# Patient Record
Sex: FEMALE | Race: BLACK OR AFRICAN AMERICAN | NOT HISPANIC OR LATINO | Employment: FULL TIME | URBAN - METROPOLITAN AREA
[De-identification: names, ages, dates, MRNs, and addresses within clinical notes are randomized per-mention and may not be internally consistent; named-entity substitution may affect disease eponyms.]

---

## 2017-08-21 ENCOUNTER — TELEPHONE (OUTPATIENT)
Dept: OBSTETRICS AND GYNECOLOGY | Facility: CLINIC | Age: 37
End: 2017-08-21

## 2017-08-21 NOTE — TELEPHONE ENCOUNTER
----- Message from Lacie Gonzalez sent at 8/21/2017  9:25 AM CDT -----  X_  1st Request  _  2nd Request  _  3rd Request        Who: Duong([facility])    Why: [facility] representative is calling to see why the pt wasn't seen today. She was told she couldn't be seen today because she needed a referral. Please call to discuss.    What Number to Call Back:[phone] reference #[id number]    When to Expect a call back: (With in 24 hours)

## 2017-08-21 NOTE — TELEPHONE ENCOUNTER
Tried to contact the patient to offer an earlier appointment. Person who answered stated she was not available.

## 2017-09-15 ENCOUNTER — OFFICE VISIT (OUTPATIENT)
Dept: OBSTETRICS AND GYNECOLOGY | Facility: CLINIC | Age: 37
End: 2017-09-15
Attending: OBSTETRICS & GYNECOLOGY
Payer: COMMERCIAL

## 2017-09-15 VITALS
WEIGHT: 173.5 LBS | HEIGHT: 67 IN | BODY MASS INDEX: 27.23 KG/M2 | SYSTOLIC BLOOD PRESSURE: 120 MMHG | DIASTOLIC BLOOD PRESSURE: 82 MMHG

## 2017-09-15 DIAGNOSIS — Z01.419 WELL WOMAN EXAM WITH ROUTINE GYNECOLOGICAL EXAM: Primary | ICD-10-CM

## 2017-09-15 DIAGNOSIS — Z12.4 PAP SMEAR FOR CERVICAL CANCER SCREENING: ICD-10-CM

## 2017-09-15 DIAGNOSIS — Z30.41 ENCOUNTER FOR SURVEILLANCE OF CONTRACEPTIVE PILLS: ICD-10-CM

## 2017-09-15 PROCEDURE — 88175 CYTOPATH C/V AUTO FLUID REDO: CPT

## 2017-09-15 PROCEDURE — 99999 PR PBB SHADOW E&M-EST. PATIENT-LVL III: CPT | Mod: PBBFAC,,, | Performed by: OBSTETRICS & GYNECOLOGY

## 2017-09-15 PROCEDURE — 99385 PREV VISIT NEW AGE 18-39: CPT | Mod: S$GLB,,, | Performed by: OBSTETRICS & GYNECOLOGY

## 2017-09-15 NOTE — PROGRESS NOTES
Julio Cesar Fajardo is a 37 y.o. female  who presents for annual exam.  No LMP recorded (lmp unknown). Patient is not currently having periods (Reason: Birth Control).  She continues on LoLoEstrin without any problems.  With this pill, she is not having any periods.  History of LEEP  .  No GYN complaints.  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    Summary:  Pap 09: ASCUS, HPV+  Colpo 09: Ecto: No dysplasia, Ecc: Negative  Pap 6/18/10: LGSIL  Pap 11: LGSIL  Colpo 11: Ecto: EARLINE II, Ecc: Negative  LEEP 10/14/11: Focal atypia  Pap 3/15/13: Negative  Pap 3/19/14: ASCUS, HPV negative      Past Medical History:   Diagnosis Date    Abnormal Pap smear     Abnormal Pap smear of cervix     leep and colpo done       Past Surgical History:   Procedure Laterality Date    CERVICAL BIOPSY  W/ LOOP ELECTRODE EXCISION      COLPOSCOPY         OB History      Para Term  AB Living    0              SAB TAB Ectopic Multiple Live Births                       ROS:  GENERAL: Feeling well overall.   SKIN: Denies rash or lesions.   HEAD: Denies head injury or headache.   NODES: Denies enlarged lymph nodes.   CHEST: Denies chest pain or shortness of breath.   CARDIOVASCULAR: Denies palpitations or left sided chest pain.   ABDOMEN: No abdominal pain, nausea, vomiting or rectal bleeding.   URINARY: No dysuria or hematuria.  REPRODUCTIVE: See HPI.   BREASTS: Denies pain, lumps, or nipple discharge.   HEMATOLOGIC: No easy bruisability or excessive bleeding.   MUSCULOSKELETAL: Denies joint pain or swelling.   NEUROLOGIC: Denies syncope or weakness.   PSYCHIATRIC: Denies depression.    PE:   (chaperone present during entire exam)  APPEARANCE: Well nourished, well developed, in no acute distress.  BREASTS: Symmetrical, no skin changes or visible lesions. No palpable masses, nipple discharge or adenopathy bilaterally.  ABDOMEN: Soft. No tenderness or masses. No hernias. No CVA tenderness.  VULVA: No lesions.  Normal female genitalia.  URETHRAL MEATUS: Normal size and location, no lesions, no prolapse.  URETHRA: No masses, tenderness, prolapse or scarring.  VAGINA: Moist and well rugated, no discharge, no significant cystocele or rectocele.  CERVIX: No lesions and discharge. PAP done.  UTERUS: Normal size, regular shape, mobile, non-tender, bladder base nontender.  ADNEXA: No masses, tenderness or CDS nodularity.  ANUS PERINEUM: Normal.      Diagnosis:  1. Well woman exam with routine gynecological exam    2. Pap smear for cervical cancer screening    3. Encounter for surveillance of contraceptive pills          PLAN:    Orders Placed This Encounter    Liquid-based pap smear, screening    norethindrone-e.estradiol-iron (LO LOESTRIN FE) 1 mg-10 mcg (24)/10 mcg (2) Tab       Patient was counseled today on the need for annual gyn exams.  We reviewed her usage of OCPs: r / b.  She is doing well with LoLoEstrin and desires to continue.    Follow-up in 1 year.

## 2017-09-20 ENCOUNTER — PATIENT MESSAGE (OUTPATIENT)
Dept: OBSTETRICS AND GYNECOLOGY | Facility: CLINIC | Age: 37
End: 2017-09-20

## 2018-02-06 ENCOUNTER — TELEPHONE (OUTPATIENT)
Dept: OBSTETRICS AND GYNECOLOGY | Facility: CLINIC | Age: 38
End: 2018-02-06

## 2018-02-06 NOTE — TELEPHONE ENCOUNTER
----- Message from Laura Rossanarox sent at 2/6/2018  9:18 AM CST -----  Contact: [facility]   x_  1st Request  _  2nd Request  _  3rd Request        Who: Rony     Why: Requesting a call back in regards to getting a prior authorization for her Birth Control.     What Number to Call Back: [phone]    When to Expect a call back: (Within 24 hours)    Please return the call at earliest convenience. Thanks!

## 2018-02-19 ENCOUNTER — PATIENT MESSAGE (OUTPATIENT)
Dept: OBSTETRICS AND GYNECOLOGY | Facility: CLINIC | Age: 38
End: 2018-02-19

## 2018-02-20 ENCOUNTER — TELEPHONE (OUTPATIENT)
Dept: OBSTETRICS AND GYNECOLOGY | Facility: CLINIC | Age: 38
End: 2018-02-20

## 2018-02-20 NOTE — TELEPHONE ENCOUNTER
I will begin the PA process for you/. Please keep in touch.Selin  ===View-only below this line===      ----- Message -----     From: Chaparrita Fajardo     Sent: 2/19/2018  5:57 PM CST       To: Bart Alfaro MD  Subject: Prescription    Hello,    My current insurance company and pharmacist have informed me that my insurance will no longer cover my BCP without a prior authorization.  My pharmacist has contacted Dr. Alfaro's medical assistant or nurse about 2 weeks ago.  I am sending this message to follow-up because the prescription is ending soon.  I prefer to continue taking the Lo Loesterin. But if not possible, I may have to change to something else.    Kindest regards,  Chaparrita Fajardo

## 2018-02-22 NOTE — TELEPHONE ENCOUNTER
HECTOR to have the pharmacist to call the office to obtain the numbmer to obtain the authorization

## 2018-02-26 ENCOUNTER — TELEPHONE (OUTPATIENT)
Dept: OBSTETRICS AND GYNECOLOGY | Facility: CLINIC | Age: 38
End: 2018-02-26

## 2018-02-26 NOTE — TELEPHONE ENCOUNTER
----- Message from Amy Fernandez sent at 2/26/2018  4:24 PM CST -----  Contact: pt  X_  1st Request  _  2nd Request  _  3rd Request    Who:RASHAD DOUGLASS [[id number]]    Why: Patient states she would like to speak with the staff in regards to getting a prior authorization for her birth control... Please contact to further discuss and advise     What Number to Call Back: [phone]    When to Expect a call back: (Before the end of the day)   -- if call after 3:00 call back will be tomorrow.

## 2018-02-26 NOTE — TELEPHONE ENCOUNTER
Told the patient by looking in the chart selin was working on her Pa for BC and that I would let Selin know she called.

## 2018-03-02 NOTE — TELEPHONE ENCOUNTER
The patient's mail box is full. I have left messages on the pharmacy's voicemail with no return call. Trying to obtain the number to call to try to obtain the PA or form to complete for the PA. Will also offer the patient the option of changeling the pill or trying to use the coupon card and letter to the pharmacist.

## 2018-03-05 ENCOUNTER — TELEPHONE (OUTPATIENT)
Dept: OBSTETRICS AND GYNECOLOGY | Facility: CLINIC | Age: 38
End: 2018-03-05

## 2018-03-05 NOTE — TELEPHONE ENCOUNTER
----- Message from Cynthia Pedroza sent at 3/5/2018  2:13 PM CST -----  Contact: RASHAD DOUGLASS [[id number]]  _x  1st Request  _  2nd Request  _  3rd Request        Who: RASHAD DOUGLASS [[id number]]    Why: Requesting a call back in regards to getting prior authorization for birth control pills   Please return the call at earliest convenience. Thanks!    What Number to Call Back: [phone]      When to Expect a call back: (Within 24 hours)

## 2018-03-09 ENCOUNTER — PATIENT MESSAGE (OUTPATIENT)
Dept: OBSTETRICS AND GYNECOLOGY | Facility: CLINIC | Age: 38
End: 2018-03-09

## 2018-03-09 ENCOUNTER — TELEPHONE (OUTPATIENT)
Dept: OBSTETRICS AND GYNECOLOGY | Facility: CLINIC | Age: 38
End: 2018-03-09

## 2018-03-09 NOTE — TELEPHONE ENCOUNTER
----- Message from Martin Ghotra sent at 3/9/2018 12:33 PM CST -----  Contact: Pt  _  1st Request  _x  2nd Request  _  3rd Request        Who: RASHAD DOUGLASS [[id number]]    Why: Requesting a call back in regards to discussing message she sent via portal and would like nurse to take a look at it.   Please return the call at earliest convenience. Thanks!  ## pt would prefer a response through portal, states it is hard to catch her via telephone.       What Number to Call Back:[phone] (m)       When to Expect a call back: (Within 24 hours)

## 2018-03-17 ENCOUNTER — PATIENT MESSAGE (OUTPATIENT)
Dept: OBSTETRICS AND GYNECOLOGY | Facility: CLINIC | Age: 38
End: 2018-03-17

## 2018-03-19 ENCOUNTER — TELEPHONE (OUTPATIENT)
Dept: OBSTETRICS AND GYNECOLOGY | Facility: CLINIC | Age: 38
End: 2018-03-19

## 2018-03-19 NOTE — TELEPHONE ENCOUNTER
Well I understand but did not want to give in so I called one more time. The denial reason is because the current OCP is not on the formulary and we did not have recent trials of other OCP that you have used in the past that did not work for you. So I'm sorry, I will send the message to Dr Alfaro for another pill to be sent to replace the [place] estrin. Please check with your pharmacy later today or first thing in the morning. Selin  ===View-only below this line===      ----- Message -----     From: Chaparrita Fajardo     Sent: 3/17/2018  7:24 PM CDT       To: Bart Alfaro MD  Subject: Prescription    Selin Jackson.    Is there any update on the pre-authorization? If not approved, please ask Dr. Alfaro to send a prescription for a different BCP.   As much as I dislike letting the insurance company win, I really should be taking a BPC.    I use [facility] [facility] [phone].    Thank you,  Chaparrita Fajardo  ----- Message -----  From: Jt Smallwood  Sent: 3/12/2018 11:58 AM CDT  To: Chaparrita Fajardo  Subject: RE: Prescription  I was able to contact with new information and just got another form. Filling it out now and faxing it very shortly. Selin    ----- Message -----     From: Chaparrita Fajardo     Sent: 3/9/2018  4:29 PM CST       To: Bart Alfaro MD  Subject: RE: Prescription    Hello,    I just called my medical insurance company using my provider information. My previous call was to the member department.  I spoke to a representative in the PA department who gave a different fax number.  Please fax the PA form to [phone].  The phone number to the department is [phone]. The representative stated that some of the PAs can be approved via phone.       Thank you,  Chaparrita Fajardo  ----- Message -----  From: Jt Smallwood  Sent: 3/9/2018  3:09 PM CST  To: Chaparrita Fajardo  Subject: RE: Prescription  I am so sorry this process is taking this long. I have faxed to that number almost daily and after 8 attempts it  is abandoned. When called and was given another number and that was accepted but then I received a response saying that was the fax number for medical PA not prescription. I have showed Dr Alfaro and he is aware.  I can call on Monday again, I also have tried to contact your pharmacy(I have left message on the voicemail) to see if they would have another number to call. I know you were almost out of pills and don't know if you still may want to change to another pill simalair to this one. Please let me know, I will proceed whichever way you would like me to. Selin     ----- Message -----     From: Chaparrita Fajardo     Sent: 3/9/2018 12:27 PM CST       To: Bart Alfaro MD  Subject: Prescription    Hello Selin.    I contacted my insurance company and I have the correct fax number for the PA.  Please send the PA form to [phone].  Please reply and let me know if this number works.    Kindest regards,  Chaparrita Fajardo  ----- Message -----  From: Med Assistant Selin  Sent: 2/20/2018  5:08 PM CST  To: Chaparrita Fajardo  Subject: RE: Prescription  I will begin the PA process for you/. Please keep in touch.Selin    ----- Message -----     From: Chaparrita Fajardo     Sent: 2/19/2018  5:57 PM CST       To: Bart Alfaro MD  Subject: Prescription    Hello,    My current insurance company and pharmacist have informed me that my insurance will no longer cover my BCP without a prior authorization.  My pharmacist has contacted Dr. Alfaro's medical assistant or nurse about 2 weeks ago.  I am sending this message to follow-up because the prescription is ending soon.  I prefer to continue taking the Lo Loesterin. But if not possible, I may have to change to something else.    Kindest regards,  Chaparrita Fajardo

## 2018-03-20 RX ORDER — NORETHINDRONE ACETATE AND ETHINYL ESTRADIOL .02; 1 MG/1; MG/1
1 TABLET ORAL DAILY
Qty: 28 TABLET | Refills: 12 | Status: SHIPPED | OUTPATIENT
Start: 2018-03-20 | End: 2018-03-21 | Stop reason: SDUPTHER

## 2018-03-21 ENCOUNTER — TELEPHONE (OUTPATIENT)
Dept: OBSTETRICS AND GYNECOLOGY | Facility: CLINIC | Age: 38
End: 2018-03-21

## 2018-03-21 RX ORDER — NORETHINDRONE ACETATE AND ETHINYL ESTRADIOL .02; 1 MG/1; MG/1
1 TABLET ORAL DAILY
Qty: 28 TABLET | Refills: 12 | Status: SHIPPED | OUTPATIENT
Start: 2018-03-21 | End: 2018-12-05

## 2018-03-21 NOTE — TELEPHONE ENCOUNTER
I am sorry, I have to take [facility] (that is where it went) out not just change the pharmacy. Dr Alfaro will resend it to the correct pharmacy very shortly. Selin  ===View-only below this line===      ----- Message -----     From: Chaparrita Fajardo     Sent: 3/20/2018 10:06 PM CDT       To: Bart Alfaro MD  Subject: RE: Prescription    Selin Jackson.    Checked with my pharmacy.  They stated they have not received a new prescription. [facility] [phone].    Chaparrita  ----- Message -----  From: Med Assistant Selin  Sent: 3/19/2018  3:19 PM CDT  To: Chaparrita Fajardo  Subject: RE: Prescription  Well I understand but did not want to give in so I called one more time. The denial reason is because the current OCP is not on the formulary and we did not have recent trials of other OCP that you have used in the past that did not work for you. So I'm sorry, I will send the message to Dr Alfaro for another pill to be sent to replace the [place] estrin. Please check with your pharmacy later today or first thing in the morning. Selin

## 2018-08-04 ENCOUNTER — PATIENT MESSAGE (OUTPATIENT)
Dept: OBSTETRICS AND GYNECOLOGY | Facility: CLINIC | Age: 38
End: 2018-08-04

## 2018-12-05 ENCOUNTER — OFFICE VISIT (OUTPATIENT)
Dept: OBSTETRICS AND GYNECOLOGY | Facility: CLINIC | Age: 38
End: 2018-12-05
Attending: OBSTETRICS & GYNECOLOGY
Payer: COMMERCIAL

## 2018-12-05 VITALS
DIASTOLIC BLOOD PRESSURE: 84 MMHG | WEIGHT: 180.88 LBS | BODY MASS INDEX: 28.33 KG/M2 | SYSTOLIC BLOOD PRESSURE: 118 MMHG

## 2018-12-05 DIAGNOSIS — Z12.31 VISIT FOR SCREENING MAMMOGRAM: ICD-10-CM

## 2018-12-05 DIAGNOSIS — Z12.4 PAP SMEAR FOR CERVICAL CANCER SCREENING: ICD-10-CM

## 2018-12-05 DIAGNOSIS — Z01.419 WELL WOMAN EXAM WITH ROUTINE GYNECOLOGICAL EXAM: Primary | ICD-10-CM

## 2018-12-05 PROCEDURE — 99396 PREV VISIT EST AGE 40-64: CPT | Mod: S$GLB,,, | Performed by: OBSTETRICS & GYNECOLOGY

## 2018-12-05 PROCEDURE — 87624 HPV HI-RISK TYP POOLED RSLT: CPT

## 2018-12-05 PROCEDURE — 88175 CYTOPATH C/V AUTO FLUID REDO: CPT

## 2018-12-05 PROCEDURE — 99999 PR PBB SHADOW E&M-EST. PATIENT-LVL III: CPT | Mod: PBBFAC,,, | Performed by: OBSTETRICS & GYNECOLOGY

## 2018-12-05 RX ORDER — NORETHINDRONE ACETATE AND ETHINYL ESTRADIOL AND FERROUS FUMARATE 1MG-20(21)
KIT ORAL
Refills: 12 | COMMUNITY
Start: 2018-11-12 | End: 2018-12-05

## 2018-12-05 NOTE — PROGRESS NOTES
Julio Cesar Fajardo is a 38 y.o. female  who presents for annual exam.  Currently, she is taking Microgestin Fe with regular monthly menses lasting 7 days in duration.  Previously, she had been on LoLoEstrin with essentially no bleeding.  She wants to return to the LoLoEstrin.  She has a history of ERALINE 2, treated with LEEP .  Her last pap 2017 was negative.  She has a family history of breast cancer with her mother.  Patient's last menstrual period was 2018 (approximate).     Summary:  Pap 09: ASCUS, HPV+  Colpo 09: Ecto: No dysplasia, Ecc: Negative  Pap 6/18/10: LGSIL  Pap 11: LGSIL  Colpo 11: Ecto: EARLINE II, Ecc: Negative  LEEP 10/14/11: Focal atypia  Pap 3/15/13: Negative  Pap 3/19/14: ASCUS, HPV negative  Pap 9/15/17: Negative    Past Medical History:   Diagnosis Date    Abnormal Pap smear     Abnormal Pap smear of cervix     leep and colpo done       Past Surgical History:   Procedure Laterality Date    CERVICAL BIOPSY  W/ LOOP ELECTRODE EXCISION      COLPOSCOPY         OB History      Para Term  AB Living    0              SAB TAB Ectopic Multiple Live Births                       ROS:  GENERAL: Feeling well overall.   SKIN: Denies rash or lesions.   HEAD: Denies head injury or headache.   NODES: Denies enlarged lymph nodes.   CHEST: Denies chest pain or shortness of breath.   CARDIOVASCULAR: Denies palpitations or left sided chest pain.   ABDOMEN: No abdominal pain, nausea, vomiting or rectal bleeding.   URINARY: No dysuria or hematuria.  REPRODUCTIVE: See HPI.   BREASTS: Denies pain, lumps, or nipple discharge.   HEMATOLOGIC: No easy bruisability or excessive bleeding.   MUSCULOSKELETAL: Denies joint pain or swelling.   NEUROLOGIC: Denies syncope or weakness.   PSYCHIATRIC: Denies depression.    PE:   (chaperone present during entire exam)  APPEARANCE: Well nourished, well developed, in no acute distress.  BREASTS: Symmetrical, no skin changes or visible lesions.  No palpable masses, nipple discharge or adenopathy bilaterally.  ABDOMEN: Soft. No tenderness or masses. No hernias. No CVA tenderness.  VULVA: No lesions. Normal female genitalia.  URETHRAL MEATUS: Normal size and location, no lesions, no prolapse.  URETHRA: No masses, tenderness, prolapse or scarring.  VAGINA: Moist and well rugated, no discharge, no significant cystocele or rectocele.  CERVIX: No lesions and discharge. PAP done.  UTERUS: Normal size, regular shape, mobile, non-tender, bladder base nontender.  ADNEXA: No masses, tenderness or CDS nodularity.  ANUS PERINEUM: Normal.      Diagnosis:  1. Well woman exam with routine gynecological exam    2. Pap smear for cervical cancer screening    3. Visit for screening mammogram          PLAN:    Orders Placed This Encounter    HPV High Risk Genotypes, PCR    Mammo Digital Screening Bilat w/ Jose Carlos    Liquid-based pap smear, screening    norethindrone-e.estradiol-iron (LO LOESTRIN FE) 1 mg-10 mcg (24)/10 mcg (2) Tab       Patient was counseled today on the need for annual gyn exams.  We reviewed her usage of OCPs: r / b.  She will convert to LoLoEstrin.  We also discussed her family history of breast cancer and need for annual mammograms.    Follow-up in 1 year.

## 2018-12-09 ENCOUNTER — PATIENT MESSAGE (OUTPATIENT)
Dept: OBSTETRICS AND GYNECOLOGY | Facility: CLINIC | Age: 38
End: 2018-12-09

## 2018-12-10 ENCOUNTER — TELEPHONE (OUTPATIENT)
Dept: OBSTETRICS AND GYNECOLOGY | Facility: CLINIC | Age: 38
End: 2018-12-10

## 2018-12-10 RX ORDER — FLUCONAZOLE 150 MG/1
TABLET ORAL
Qty: 2 TABLET | Refills: 0 | Status: SHIPPED | OUTPATIENT
Start: 2018-12-10 | End: 2022-02-11

## 2018-12-10 NOTE — TELEPHONE ENCOUNTER
I will send the request to Dr Alfaro. Please check with the pharmacy later today. Selin  ===View-only below this line===      ----- Message -----     From: Chaparrita Fajardo     Sent: 12/9/2018 11:46 AM CST       To: Bart Alfaro MD  Subject: Prescription    Hello,    I am in need of a Diflucan prescription.  Please ask Dr. Alfaro to send it to [facility].    Thank you,  Chaparrita Fajardo

## 2018-12-11 LAB
HPV HR 12 DNA CVX QL NAA+PROBE: NEGATIVE
HPV16 AG SPEC QL: NEGATIVE
HPV18 DNA SPEC QL NAA+PROBE: NEGATIVE

## 2018-12-13 ENCOUNTER — PATIENT MESSAGE (OUTPATIENT)
Dept: OBSTETRICS AND GYNECOLOGY | Facility: CLINIC | Age: 38
End: 2018-12-13

## 2019-01-04 ENCOUNTER — HOSPITAL ENCOUNTER (OUTPATIENT)
Dept: RADIOLOGY | Facility: HOSPITAL | Age: 39
Discharge: HOME | End: 2019-01-04
Attending: OBSTETRICS & GYNECOLOGY
Payer: COMMERCIAL

## 2019-01-04 DIAGNOSIS — Z12.31 VISIT FOR SCREENING MAMMOGRAM: ICD-10-CM

## 2019-01-04 PROCEDURE — 77067 SCR MAMMO BI INCL CAD: CPT | Mod: 26,,, | Performed by: RADIOLOGY

## 2019-01-04 PROCEDURE — 77063 MAMMO DIGITAL SCREENING BILAT WITH TOMOSYNTHESIS_CAD: ICD-10-PCS | Mod: 26,,, | Performed by: RADIOLOGY

## 2019-01-04 PROCEDURE — 77067 SCR MAMMO BI INCL CAD: CPT | Mod: TC

## 2019-01-04 PROCEDURE — 77063 BREAST TOMOSYNTHESIS BI: CPT | Mod: 26,,, | Performed by: RADIOLOGY

## 2019-01-04 PROCEDURE — 77067 MAMMO DIGITAL SCREENING BILAT WITH TOMOSYNTHESIS_CAD: ICD-10-PCS | Mod: 26,,, | Performed by: RADIOLOGY

## 2019-01-09 ENCOUNTER — TELEPHONE (OUTPATIENT)
Dept: OBSTETRICS AND GYNECOLOGY | Facility: CLINIC | Age: 39
End: 2019-01-09

## 2019-02-04 NOTE — TELEPHONE ENCOUNTER
Called patient:    Unable to leave message - mailbox full.    Patient portal message sent:  I have been unable to reach you by phone.  Your recent mammogram was normal!  Each mammogram now has a Tyrer-Cuzick scale which is a tool to help determine patients that might be at an increased risk for breast cancer during their lifetime and could benefit for additional screening.  Your value was mildly elevated.  Please give us a call when you have a chance and we can discuss this in more detail.  -Dr. Alfaro

## 2019-02-06 ENCOUNTER — PATIENT MESSAGE (OUTPATIENT)
Dept: OBSTETRICS AND GYNECOLOGY | Facility: CLINIC | Age: 39
End: 2019-02-06

## 2019-12-18 RX ORDER — NORETHINDRONE ACETATE AND ETHINYL ESTRADIOL, ETHINYL ESTRADIOL AND FERROUS FUMARATE 1MG-10(24)
KIT ORAL
Qty: 28 TABLET | Refills: 3 | Status: SHIPPED | OUTPATIENT
Start: 2019-12-18 | End: 2020-04-01

## 2019-12-20 ENCOUNTER — PATIENT MESSAGE (OUTPATIENT)
Dept: OBSTETRICS AND GYNECOLOGY | Facility: CLINIC | Age: 39
End: 2019-12-20

## 2019-12-30 ENCOUNTER — PATIENT MESSAGE (OUTPATIENT)
Dept: OBSTETRICS AND GYNECOLOGY | Facility: CLINIC | Age: 39
End: 2019-12-30

## 2020-01-23 ENCOUNTER — TELEPHONE (OUTPATIENT)
Dept: OBSTETRICS AND GYNECOLOGY | Facility: CLINIC | Age: 40
End: 2020-01-23

## 2020-01-23 ENCOUNTER — PATIENT MESSAGE (OUTPATIENT)
Dept: OBSTETRICS AND GYNECOLOGY | Facility: CLINIC | Age: 40
End: 2020-01-23

## 2020-01-23 NOTE — TELEPHONE ENCOUNTER
"Called pt. No answer. Unable to leave a VM - "mailbox full  And not accepting calls at this time"    Calling regarding PA.      [facility] msg sent!  "

## 2020-04-01 RX ORDER — NORETHINDRONE ACETATE AND ETHINYL ESTRADIOL, ETHINYL ESTRADIOL AND FERROUS FUMARATE 1MG-10(24)
KIT ORAL
Qty: 28 TABLET | Refills: 3 | Status: SHIPPED | OUTPATIENT
Start: 2020-04-01 | End: 2020-07-23

## 2021-02-18 ENCOUNTER — PATIENT MESSAGE (OUTPATIENT)
Dept: OBSTETRICS AND GYNECOLOGY | Facility: CLINIC | Age: 41
End: 2021-02-18

## 2021-02-19 ENCOUNTER — TELEPHONE (OUTPATIENT)
Dept: OBSTETRICS AND GYNECOLOGY | Facility: CLINIC | Age: 41
End: 2021-02-19

## 2021-02-19 RX ORDER — NORETHINDRONE ACETATE AND ETHINYL ESTRADIOL, ETHINYL ESTRADIOL AND FERROUS FUMARATE 1MG-10(24)
1 KIT ORAL DAILY
Qty: 28 TABLET | Refills: 0 | Status: SHIPPED | OUTPATIENT
Start: 2021-02-19 | End: 2021-03-18

## 2021-04-22 RX ORDER — NORETHINDRONE ACETATE AND ETHINYL ESTRADIOL, ETHINYL ESTRADIOL AND FERROUS FUMARATE 1MG-10(24)
1 KIT ORAL DAILY
Qty: 28 TABLET | Refills: 2 | Status: SHIPPED | OUTPATIENT
Start: 2021-04-22 | End: 2021-08-19

## 2022-02-11 ENCOUNTER — OFFICE VISIT (OUTPATIENT)
Dept: OBSTETRICS AND GYNECOLOGY | Facility: CLINIC | Age: 42
End: 2022-02-11
Payer: COMMERCIAL

## 2022-02-11 VITALS
DIASTOLIC BLOOD PRESSURE: 82 MMHG | WEIGHT: 178.56 LBS | HEIGHT: 67 IN | SYSTOLIC BLOOD PRESSURE: 132 MMHG | BODY MASS INDEX: 28.02 KG/M2

## 2022-02-11 DIAGNOSIS — Z01.419 ENCOUNTER FOR ANNUAL ROUTINE GYNECOLOGICAL EXAMINATION: Primary | ICD-10-CM

## 2022-02-11 DIAGNOSIS — Z12.31 VISIT FOR SCREENING MAMMOGRAM: ICD-10-CM

## 2022-02-11 DIAGNOSIS — Z12.4 PAP SMEAR FOR CERVICAL CANCER SCREENING: ICD-10-CM

## 2022-02-11 DIAGNOSIS — Z30.41 ENCOUNTER FOR SURVEILLANCE OF CONTRACEPTIVE PILLS: ICD-10-CM

## 2022-02-11 PROCEDURE — 1160F PR REVIEW ALL MEDS BY PRESCRIBER/CLIN PHARMACIST DOCUMENTED: ICD-10-PCS | Mod: CPTII,S$GLB,, | Performed by: OBSTETRICS & GYNECOLOGY

## 2022-02-11 PROCEDURE — 99386 PR PREVENTIVE VISIT,NEW,40-64: ICD-10-PCS | Mod: S$GLB,,, | Performed by: OBSTETRICS & GYNECOLOGY

## 2022-02-11 PROCEDURE — 1159F MED LIST DOCD IN RCRD: CPT | Mod: CPTII,S$GLB,, | Performed by: OBSTETRICS & GYNECOLOGY

## 2022-02-11 PROCEDURE — 1159F PR MEDICATION LIST DOCUMENTED IN MEDICAL RECORD: ICD-10-PCS | Mod: CPTII,S$GLB,, | Performed by: OBSTETRICS & GYNECOLOGY

## 2022-02-11 PROCEDURE — 1160F RVW MEDS BY RX/DR IN RCRD: CPT | Mod: CPTII,S$GLB,, | Performed by: OBSTETRICS & GYNECOLOGY

## 2022-02-11 PROCEDURE — 3008F BODY MASS INDEX DOCD: CPT | Mod: CPTII,S$GLB,, | Performed by: OBSTETRICS & GYNECOLOGY

## 2022-02-11 PROCEDURE — 3079F DIAST BP 80-89 MM HG: CPT | Mod: CPTII,S$GLB,, | Performed by: OBSTETRICS & GYNECOLOGY

## 2022-02-11 PROCEDURE — 99999 PR PBB SHADOW E&M-EST. PATIENT-LVL III: CPT | Mod: PBBFAC,,, | Performed by: OBSTETRICS & GYNECOLOGY

## 2022-02-11 PROCEDURE — 99386 PREV VISIT NEW AGE 40-64: CPT | Mod: S$GLB,,, | Performed by: OBSTETRICS & GYNECOLOGY

## 2022-02-11 PROCEDURE — 3079F PR MOST RECENT DIASTOLIC BLOOD PRESSURE 80-89 MM HG: ICD-10-PCS | Mod: CPTII,S$GLB,, | Performed by: OBSTETRICS & GYNECOLOGY

## 2022-02-11 PROCEDURE — 3075F PR MOST RECENT SYSTOLIC BLOOD PRESS GE 130-139MM HG: ICD-10-PCS | Mod: CPTII,S$GLB,, | Performed by: OBSTETRICS & GYNECOLOGY

## 2022-02-11 PROCEDURE — 87624 HPV HI-RISK TYP POOLED RSLT: CPT | Performed by: OBSTETRICS & GYNECOLOGY

## 2022-02-11 PROCEDURE — 3075F SYST BP GE 130 - 139MM HG: CPT | Mod: CPTII,S$GLB,, | Performed by: OBSTETRICS & GYNECOLOGY

## 2022-02-11 PROCEDURE — 88175 CYTOPATH C/V AUTO FLUID REDO: CPT | Performed by: PATHOLOGY

## 2022-02-11 PROCEDURE — 88141 CYTOPATH C/V INTERPRET: CPT | Mod: ,,, | Performed by: PATHOLOGY

## 2022-02-11 PROCEDURE — 88141 PR  CYTOPATH CERV/VAG INTERPRET: ICD-10-PCS | Mod: ,,, | Performed by: PATHOLOGY

## 2022-02-11 PROCEDURE — 99999 PR PBB SHADOW E&M-EST. PATIENT-LVL III: ICD-10-PCS | Mod: PBBFAC,,, | Performed by: OBSTETRICS & GYNECOLOGY

## 2022-02-11 PROCEDURE — 3008F PR BODY MASS INDEX (BMI) DOCUMENTED: ICD-10-PCS | Mod: CPTII,S$GLB,, | Performed by: OBSTETRICS & GYNECOLOGY

## 2022-02-11 RX ORDER — NORETHINDRONE ACETATE AND ETHINYL ESTRADIOL, ETHINYL ESTRADIOL AND FERROUS FUMARATE 1MG-10(24)
1 KIT ORAL DAILY
Qty: 84 TABLET | Refills: 4 | Status: SHIPPED | OUTPATIENT
Start: 2022-02-11 | End: 2023-03-02

## 2022-02-11 RX ORDER — NORETHINDRONE ACETATE AND ETHINYL ESTRADIOL, ETHINYL ESTRADIOL AND FERROUS FUMARATE 1MG-10(24)
1 KIT ORAL DAILY
Qty: 28 TABLET | Refills: 0 | Status: CANCELLED | OUTPATIENT
Start: 2022-02-11

## 2022-02-11 NOTE — PROGRESS NOTES
Julio Cesar Fajardo is a 41 y.o. female  who presents for annual exam.  She continues on LoLoEstrin without any bleeding.  She has a history of LEEP  with essentially normal paps subsequently with last pap / exam .  Denies recent changes in her medical / surgical history.  No GYN complaints.   No LMP recorded (lmp unknown). (Menstrual status: Birth Control).     Summary:  Pap 09: ASCUS, HPV+  Colpo 09: Ecto: No dysplasia, Ecc: Negative  Pap 6/18/10: LGSIL  Pap 11: LGSIL  Colpo 11: Ecto: EARLINE II, Ecc: Negative  LEEP 10/14/11: Focal atypia  Pap 3/15/13: Negative  Pap 3/19/14: ASCUS, HPV negative  Pap 9/15/17: Negative  Pap 18: Negative      Past Medical History:   Diagnosis Date    Abnormal Pap smear     Abnormal Pap smear of cervix     leep and colpo done       Past Surgical History:   Procedure Laterality Date    CERVICAL BIOPSY  W/ LOOP ELECTRODE EXCISION      COLPOSCOPY         OB History        0    Para        Term   0            AB        Living           SAB        IAB        Ectopic        Multiple        Live Births                     ROS:  GENERAL: Feeling well overall.   SKIN: Denies rash or lesions.   HEAD: Denies head injury or headache.   NODES: Denies enlarged lymph nodes.   CHEST: Denies chest pain or shortness of breath.   CARDIOVASCULAR: Denies palpitations or left sided chest pain.   ABDOMEN: No abdominal pain, nausea, vomiting or rectal bleeding.   URINARY: No dysuria or hematuria.  REPRODUCTIVE: See HPI.   BREASTS: Denies pain, lumps, or nipple discharge.   HEMATOLOGIC: No easy bruisability or excessive bleeding.   MUSCULOSKELETAL: Denies joint pain or swelling.   NEUROLOGIC: Denies syncope or weakness.   PSYCHIATRIC: Denies depression.    PE:   (chaperone present during entire exam)  APPEARANCE: Well nourished, well developed, in no acute distress.  BREASTS: Symmetrical, no skin changes or visible lesions. No palpable masses, nipple discharge  or adenopathy bilaterally.  ABDOMEN: Soft. No tenderness or masses. No CVA tenderness.  VULVA: No lesions. Normal female genitalia.  URETHRAL MEATUS: Normal size and location, no lesions, no prolapse.  URETHRA: No masses, tenderness, prolapse or scarring.  VAGINA: Moist and well rugated, no abnormal discharge, no significant cystocele or rectocele.  CERVIX: No lesions and discharge. PAP done.  UTERUS: Normal size, retroflexed, non-tender, bladder base nontender.  ADNEXA: No masses, tenderness or CDS nodularity.  ANUS PERINEUM: Normal.      Diagnosis:  1. Encounter for annual routine gynecological examination    2. Pap smear for cervical cancer screening    3. Encounter for surveillance of contraceptive pills    4. Visit for screening mammogram          PLAN:    Orders Placed This Encounter    HPV High Risk Genotypes, PCR    Mammo Digital Screening Bilat w/ Jose Carlos    Liquid-Based Pap Smear, Screening    norethindrone-e.estradioL-iron (LO LOESTRIN FE) 1 mg-10 mcg (24)/10 mcg (2) Tab       Patient was counseled today on the need for annual gyn exams.  We reviewed her usage of OCPs: r / b.  She is doing well on LoLoEstrin and desires to continue.    Follow-up in 1 year.    Answers for HPI/ROS submitted by the patient on 2/10/2022  genital itching: No  genital lesions: No  genital odor: No  genital rash: No  missed menses: No  pelvic pain: No  vaginal bleeding: No  vaginal discharge: No  Pain severity: no pain  Pregnant now?: No  abdominal pain: No  anorexia: No  back pain: No  chills: No  constipation: No  diarrhea: No  discolored urine: No  dysuria: No  flank pain: No  frequency: No  headaches: No  hematuria: No  nausea: No  painful intercourse: No  rash: No  urgency: No  vomiting: No  Vaginal bleeding: no bleeding  Passing clots?: No  Passing tissue?: No  Birth control: oral contraceptives  STD: No  abdominal surgery: No   section: No  Ectopic pregnancy: No  Endometriosis: No  herpes simplex:  No  gynecological surgery: Yes  menorrhagia: No  metrorrhagia: No  miscarriage: No  ovarian cysts: No  perineal abscess: No  PID: No  terminated pregnancy: No  vaginosis: No

## 2022-02-16 LAB
HPV HR 12 DNA SPEC QL NAA+PROBE: NEGATIVE
HPV16 AG SPEC QL: NEGATIVE
HPV18 DNA SPEC QL NAA+PROBE: NEGATIVE

## 2022-02-18 LAB
FINAL PATHOLOGIC DIAGNOSIS: NORMAL
Lab: NORMAL

## 2022-02-20 ENCOUNTER — PATIENT MESSAGE (OUTPATIENT)
Dept: OBSTETRICS AND GYNECOLOGY | Facility: CLINIC | Age: 42
End: 2022-02-20
Payer: COMMERCIAL

## 2022-02-24 NOTE — TELEPHONE ENCOUNTER
- Cont Lopressor; dose increased to 50 mg BID on 2/22 due to uncontrolled rate (d/w cards)  - A fib w/ RVR 2/24 - given additional dose of metoprolol IV, rate improved - monitor   - Hold Eliquis until at least 14 days post craniotomy (3/3) -> d/w  Neurosurgery after clinic follow up prior to resuming.  - Monitor electrolytes closely.  - Telemetry    Called patient:     No answer - unable to leave message - mailbox full

## 2022-03-11 ENCOUNTER — HOSPITAL ENCOUNTER (OUTPATIENT)
Dept: RADIOLOGY | Facility: HOSPITAL | Age: 42
Discharge: HOME | End: 2022-03-11
Attending: OBSTETRICS & GYNECOLOGY
Payer: COMMERCIAL

## 2022-03-11 VITALS — HEIGHT: 69 IN | WEIGHT: 174 LBS | BODY MASS INDEX: 25.77 KG/M2

## 2022-03-11 DIAGNOSIS — Z01.419 ENCOUNTER FOR ANNUAL ROUTINE GYNECOLOGICAL EXAMINATION: ICD-10-CM

## 2022-03-11 PROCEDURE — 77067 SCR MAMMO BI INCL CAD: CPT | Mod: 26,,, | Performed by: RADIOLOGY

## 2022-03-11 PROCEDURE — 77067 MAMMO DIGITAL SCREENING BILAT WITH TOMO: ICD-10-PCS | Mod: 26,,, | Performed by: RADIOLOGY

## 2022-03-11 PROCEDURE — 77063 MAMMO DIGITAL SCREENING BILAT WITH TOMO: ICD-10-PCS | Mod: 26,,, | Performed by: RADIOLOGY

## 2022-03-11 PROCEDURE — 77063 BREAST TOMOSYNTHESIS BI: CPT | Mod: 26,,, | Performed by: RADIOLOGY

## 2022-03-11 PROCEDURE — 77067 SCR MAMMO BI INCL CAD: CPT | Mod: TC

## 2022-03-15 ENCOUNTER — PATIENT MESSAGE (OUTPATIENT)
Dept: OBSTETRICS AND GYNECOLOGY | Facility: CLINIC | Age: 42
End: 2022-03-15
Payer: COMMERCIAL

## 2023-03-02 RX ORDER — NORETHINDRONE ACETATE AND ETHINYL ESTRADIOL, ETHINYL ESTRADIOL AND FERROUS FUMARATE 1MG-10(24)
1 KIT ORAL DAILY
Qty: 28 TABLET | Refills: 4 | Status: SHIPPED | OUTPATIENT
Start: 2023-03-02 | End: 2023-07-29

## 2023-07-29 RX ORDER — NORETHINDRONE ACETATE AND ETHINYL ESTRADIOL, ETHINYL ESTRADIOL AND FERROUS FUMARATE 1MG-10(24)
1 KIT ORAL DAILY
Qty: 28 TABLET | Refills: 0 | Status: SHIPPED | OUTPATIENT
Start: 2023-07-29 | End: 2023-09-23 | Stop reason: SDUPTHER

## 2023-07-29 NOTE — TELEPHONE ENCOUNTER
Refill Routing Note   Medication(s) are not appropriate for processing by [facility] for the following reason(s):      Patient not seen by provider within 15 months  Required labs outdated    [facility] action(s):  Defer Care Due:  None identified            Appointments  past 12m or future 3m with PCP    Date Provider   Last Visit   2/11/2022 Bart Alfaro MD   Next Visit   Visit date not found Bart Alfaro MD   ED visits in past 90 days: 0        Note composed:10:12 AM 07/29/2023

## 2023-10-04 ENCOUNTER — PATIENT MESSAGE (OUTPATIENT)
Dept: OBSTETRICS AND GYNECOLOGY | Facility: CLINIC | Age: 43
End: 2023-10-04
Payer: COMMERCIAL

## 2023-10-06 ENCOUNTER — OFFICE VISIT (OUTPATIENT)
Dept: OBSTETRICS AND GYNECOLOGY | Facility: CLINIC | Age: 43
End: 2023-10-06
Attending: OBSTETRICS & GYNECOLOGY
Payer: COMMERCIAL

## 2023-10-06 VITALS
DIASTOLIC BLOOD PRESSURE: 100 MMHG | WEIGHT: 185.63 LBS | BODY MASS INDEX: 27.49 KG/M2 | SYSTOLIC BLOOD PRESSURE: 166 MMHG | HEIGHT: 69 IN

## 2023-10-06 DIAGNOSIS — N85.2 ENLARGED UTERUS: ICD-10-CM

## 2023-10-06 DIAGNOSIS — Z30.41 ORAL CONTRACEPTIVE PILL SURVEILLANCE: ICD-10-CM

## 2023-10-06 DIAGNOSIS — Z12.4 PAP SMEAR FOR CERVICAL CANCER SCREENING: ICD-10-CM

## 2023-10-06 DIAGNOSIS — Z12.31 VISIT FOR SCREENING MAMMOGRAM: ICD-10-CM

## 2023-10-06 DIAGNOSIS — Z01.419 WOMEN'S ANNUAL ROUTINE GYNECOLOGICAL EXAMINATION: Primary | ICD-10-CM

## 2023-10-06 PROCEDURE — 1159F MED LIST DOCD IN RCRD: CPT | Mod: CPTII,S$GLB,, | Performed by: OBSTETRICS & GYNECOLOGY

## 2023-10-06 PROCEDURE — 3008F BODY MASS INDEX DOCD: CPT | Mod: CPTII,S$GLB,, | Performed by: OBSTETRICS & GYNECOLOGY

## 2023-10-06 PROCEDURE — 99396 PR PREVENTIVE VISIT,EST,40-64: ICD-10-PCS | Mod: S$GLB,,, | Performed by: OBSTETRICS & GYNECOLOGY

## 2023-10-06 PROCEDURE — 88175 CYTOPATH C/V AUTO FLUID REDO: CPT | Performed by: OBSTETRICS & GYNECOLOGY

## 2023-10-06 PROCEDURE — 3077F SYST BP >= 140 MM HG: CPT | Mod: CPTII,S$GLB,, | Performed by: OBSTETRICS & GYNECOLOGY

## 2023-10-06 PROCEDURE — 99999 PR PBB SHADOW E&M-EST. PATIENT-LVL III: ICD-10-PCS | Mod: PBBFAC,,, | Performed by: OBSTETRICS & GYNECOLOGY

## 2023-10-06 PROCEDURE — 3008F PR BODY MASS INDEX (BMI) DOCUMENTED: ICD-10-PCS | Mod: CPTII,S$GLB,, | Performed by: OBSTETRICS & GYNECOLOGY

## 2023-10-06 PROCEDURE — 1160F RVW MEDS BY RX/DR IN RCRD: CPT | Mod: CPTII,S$GLB,, | Performed by: OBSTETRICS & GYNECOLOGY

## 2023-10-06 PROCEDURE — 3080F DIAST BP >= 90 MM HG: CPT | Mod: CPTII,S$GLB,, | Performed by: OBSTETRICS & GYNECOLOGY

## 2023-10-06 PROCEDURE — 99999 PR PBB SHADOW E&M-EST. PATIENT-LVL III: CPT | Mod: PBBFAC,,, | Performed by: OBSTETRICS & GYNECOLOGY

## 2023-10-06 PROCEDURE — 99396 PREV VISIT EST AGE 40-64: CPT | Mod: S$GLB,,, | Performed by: OBSTETRICS & GYNECOLOGY

## 2023-10-06 PROCEDURE — 3080F PR MOST RECENT DIASTOLIC BLOOD PRESSURE >= 90 MM HG: ICD-10-PCS | Mod: CPTII,S$GLB,, | Performed by: OBSTETRICS & GYNECOLOGY

## 2023-10-06 PROCEDURE — 1159F PR MEDICATION LIST DOCUMENTED IN MEDICAL RECORD: ICD-10-PCS | Mod: CPTII,S$GLB,, | Performed by: OBSTETRICS & GYNECOLOGY

## 2023-10-06 PROCEDURE — 1160F PR REVIEW ALL MEDS BY PRESCRIBER/CLIN PHARMACIST DOCUMENTED: ICD-10-PCS | Mod: CPTII,S$GLB,, | Performed by: OBSTETRICS & GYNECOLOGY

## 2023-10-06 PROCEDURE — 87624 HPV HI-RISK TYP POOLED RSLT: CPT | Performed by: OBSTETRICS & GYNECOLOGY

## 2023-10-06 PROCEDURE — 3077F PR MOST RECENT SYSTOLIC BLOOD PRESSURE >= 140 MM HG: ICD-10-PCS | Mod: CPTII,S$GLB,, | Performed by: OBSTETRICS & GYNECOLOGY

## 2023-10-06 RX ORDER — DROSPIRENONE 4 MG/1
4 TABLET, FILM COATED ORAL DAILY
Qty: 28 TABLET | Refills: 12 | Status: SHIPPED | OUTPATIENT
Start: 2023-10-06 | End: 2024-01-19 | Stop reason: SDUPTHER

## 2023-10-06 NOTE — PROGRESS NOTES
"Julio Cesar Fajardo is 43 y.o. female  who presents today for annual exam.  She is currently on LoLoEstrin OCPs without any problems.  With this pill, she essentially does not have any bleeding.  Denies recent changes in her medical or surgical history.  No gyn complaints.  Patient's last menstrual period was 2023 (exact date).    Blood pressure (!) 166/100, height 5' 9" (1.753 m), weight 84.2 kg (185 lb 10 oz), last menstrual period 2023.    Summary:  Pap 09: ASCUS, HPV+  Colpo 09: Ecto: No dysplasia, Ecc: Negative  Pap 6/18/10: LGSIL  Pap 11: LGSIL  Colpo 11: Ecto: EARLINE II, Ecc: Negative  LEEP 10/14/11: Focal atypia  Pap 3/15/13: Negative  Pap 3/19/14: ASCUS, HPV negative  Pap 9/15/17: Negative  Pap 18: Negative  Pap 22: Negative, HPV: Negative    3/11/22 Mammogram: Negative, TC 16.71       Past Medical History:   Diagnosis Date    Abnormal Pap smear     Abnormal Pap smear of cervix     leep and colpo done       Past Surgical History:   Procedure Laterality Date    CERVICAL BIOPSY  W/ LOOP ELECTRODE EXCISION      COLPOSCOPY           ROS:  GENERAL: Feeling well overall.   SKIN: Denies rash or lesions.   HEAD: Denies head injury or headache.   NODES: Denies enlarged lymph nodes.   CHEST: Denies chest pain or shortness of breath.   CARDIOVASCULAR: Denies palpitations or left sided chest pain.   ABDOMEN: No abdominal pain, nausea, vomiting or rectal bleeding.   URINARY: No dysuria or hematuria.  REPRODUCTIVE: See HPI.   BREASTS: Denies pain, lumps, or nipple discharge.   HEMATOLOGIC: No easy bruisability or excessive bleeding.   MUSCULOSKELETAL: Denies joint pain or swelling.   NEUROLOGIC: Denies syncope or weakness.   PSYCHIATRIC: Denies depression.    PE:   (chaperone present during entire exam)  APPEARANCE: Well nourished, well developed, in no acute distress.  BREASTS: Symmetrical, no skin changes or visible lesions. No palpable masses, nipple discharge or adenopathy " bilaterally.  ABDOMEN: Soft. No tenderness or masses. No CVA tenderness.  VULVA: No lesions. Normal female genitalia.  URETHRAL MEATUS: Normal size and location, no lesions, no prolapse.  URETHRA: No masses, tenderness, prolapse or scarring.  VAGINA: Moist and well rugated, no abnormal discharge, no significant cystocele or rectocele.  CERVIX: No lesions and discharge. PAP done.  UTERUS: Mildly enlarged in size, non-tender, bladder base nontender.  ADNEXA: No masses, tenderness or CDS nodularity.  ANUS PERINEUM: Normal.      Diagnosis:  1. Women's annual routine gynecological examination    2. Oral contraceptive pill surveillance    3. Enlarged uterus    4. Pap smear for cervical cancer screening    5. Visit for screening mammogram          PLAN:    Orders Placed This Encounter    HPV High Risk Genotypes, PCR    Mammo Digital Screening Bilat w/ Jose Carlos    US Pelvis Comp with Transvag NON-OB (xpd    Liquid-Based Pap Smear, Screening    drospirenone, contraceptive, (SLYND) 4 mg (28) Tab         Patient was counseled today on the usage of LoLoEstrin OCPs.  Her BP was noted to be significantly elevated: 170/100, 166/100.  We discussed the need for her to see her PCP for evaluation.  We also reviewed the risks with hypertension / OCPs and the need to convert from a combination pill to a progesterone only pill.  We discussed Slynd: r / b / correct usage.  She will let us know her progress with her BP.  We also discussed her uterus which felt mildly enlarge on exam today.  She has a family history of fibroids and will have a pelvic ultrasound performed for additional evaluation.    Follow-up after ultrasound.  Annual exam in one year.    This note was created with voice recognition software.  Grammatical, syntax and spelling errors may be inevitable.

## 2023-10-12 LAB
CLINICAL INFO: NORMAL
CYTO CVX: NORMAL
CYTOLOGIST CVX/VAG CYTO: NORMAL
CYTOLOGIST CVX/VAG CYTO: NORMAL
CYTOLOGY CMNT CVX/VAG CYTO-IMP: NORMAL
CYTOLOGY PAP THIN PREP EXPLANATION: NORMAL
DATE OF PREVIOUS PAP: NORMAL
DATE PREVIOUS BX: YES
GEN CATEG CVX/VAG CYTO-IMP: NORMAL
HPV I/H RISK 4 DNA CVX QL NAA+PROBE: NOT DETECTED
LMP START DATE: NORMAL
MICROORGANISM CVX/VAG CYTO: NORMAL
PATHOLOGIST CVX/VAG CYTO: NORMAL
SERVICE CMNT-IMP: NORMAL
SPECIMEN SOURCE CVX/VAG CYTO: NORMAL
STAT OF ADQ CVX/VAG CYTO-IMP: NORMAL

## 2023-10-16 ENCOUNTER — TELEPHONE (OUTPATIENT)
Dept: OBSTETRICS AND GYNECOLOGY | Facility: CLINIC | Age: 43
End: 2023-10-16
Payer: COMMERCIAL

## 2023-10-16 NOTE — TELEPHONE ENCOUNTER
Please let her know that HPV screening was negative but the pap cytology was unable to be processed by the lab.  She will need to return for another pap.  Thanks.

## 2023-10-18 ENCOUNTER — HOSPITAL ENCOUNTER (OUTPATIENT)
Dept: RADIOLOGY | Facility: OTHER | Age: 43
Discharge: HOME | End: 2023-10-18
Attending: OBSTETRICS & GYNECOLOGY
Payer: COMMERCIAL

## 2023-10-18 DIAGNOSIS — Z12.31 VISIT FOR SCREENING MAMMOGRAM: ICD-10-CM

## 2023-10-18 PROCEDURE — 77067 SCR MAMMO BI INCL CAD: CPT | Mod: TC

## 2023-10-18 PROCEDURE — 77067 SCR MAMMO BI INCL CAD: CPT | Mod: 26,,, | Performed by: RADIOLOGY

## 2023-10-18 PROCEDURE — 77067 MAMMO DIGITAL SCREENING BILAT WITH TOMO: ICD-10-PCS | Mod: 26,,, | Performed by: RADIOLOGY

## 2023-10-18 PROCEDURE — 77063 MAMMO DIGITAL SCREENING BILAT WITH TOMO: ICD-10-PCS | Mod: 26,,, | Performed by: RADIOLOGY

## 2023-10-18 PROCEDURE — 77063 BREAST TOMOSYNTHESIS BI: CPT | Mod: 26,,, | Performed by: RADIOLOGY

## 2023-10-23 ENCOUNTER — PATIENT MESSAGE (OUTPATIENT)
Dept: OBSTETRICS AND GYNECOLOGY | Facility: CLINIC | Age: 43
End: 2023-10-23
Payer: COMMERCIAL

## 2023-10-25 ENCOUNTER — PATIENT MESSAGE (OUTPATIENT)
Dept: OBSTETRICS AND GYNECOLOGY | Facility: CLINIC | Age: 43
End: 2023-10-25
Payer: COMMERCIAL

## 2023-11-10 ENCOUNTER — PATIENT MESSAGE (OUTPATIENT)
Dept: OBSTETRICS AND GYNECOLOGY | Facility: CLINIC | Age: 43
End: 2023-11-10
Payer: COMMERCIAL

## 2023-11-16 ENCOUNTER — TELEPHONE (OUTPATIENT)
Dept: OBSTETRICS AND GYNECOLOGY | Facility: CLINIC | Age: 43
End: 2023-11-16
Payer: COMMERCIAL

## 2023-11-16 NOTE — TELEPHONE ENCOUNTER
11/16/23 Message 10/2023 and 11/10/23 read notifying the patient a recollect was needed///////////10/23/23 Unable to leave a VM to have the patient call to schedule the recollect 10/25/23 Message sent through the portal     Detail Level: Simple Plan: 5FU BUD X 2-4 WEEKS

## 2023-11-27 ENCOUNTER — TELEPHONE (OUTPATIENT)
Dept: OBSTETRICS AND GYNECOLOGY | Facility: CLINIC | Age: 43
End: 2023-11-27
Payer: COMMERCIAL

## 2023-11-27 NOTE — TELEPHONE ENCOUNTER
Called patient:    Discussed pap:  Unsatisfactory, HPV Negative    REC:  Repeat pap    She wants to schedule appt on-line.

## 2023-12-09 ENCOUNTER — HOSPITAL ENCOUNTER (OUTPATIENT)
Dept: RADIOLOGY | Facility: OTHER | Age: 43
Discharge: HOME | End: 2023-12-09
Attending: OBSTETRICS & GYNECOLOGY
Payer: COMMERCIAL

## 2023-12-09 DIAGNOSIS — N85.2 ENLARGED UTERUS: ICD-10-CM

## 2023-12-09 PROCEDURE — 76856 US PELVIS COMP WITH TRANSVAG NON-OB (XPD): ICD-10-PCS | Mod: 26,,, | Performed by: RADIOLOGY

## 2023-12-09 PROCEDURE — 76830 TRANSVAGINAL US NON-OB: CPT | Mod: TC

## 2023-12-09 PROCEDURE — 76830 TRANSVAGINAL US NON-OB: CPT | Mod: 26,,, | Performed by: RADIOLOGY

## 2023-12-09 PROCEDURE — 76856 US EXAM PELVIC COMPLETE: CPT | Mod: 26,,, | Performed by: RADIOLOGY

## 2023-12-09 PROCEDURE — 76830 US PELVIS COMP WITH TRANSVAG NON-OB (XPD): ICD-10-PCS | Mod: 26,,, | Performed by: RADIOLOGY

## 2023-12-15 ENCOUNTER — TELEPHONE (OUTPATIENT)
Dept: OBSTETRICS AND GYNECOLOGY | Facility: CLINIC | Age: 43
End: 2023-12-15
Payer: COMMERCIAL

## 2023-12-15 NOTE — TELEPHONE ENCOUNTER
Called patient:    No answer, unable to leave message- mailbox full    Patient portal message sent.

## 2024-01-19 ENCOUNTER — OFFICE VISIT (OUTPATIENT)
Dept: OBSTETRICS AND GYNECOLOGY | Facility: CLINIC | Age: 44
End: 2024-01-19
Attending: OBSTETRICS & GYNECOLOGY
Payer: COMMERCIAL

## 2024-01-19 VITALS
BODY MASS INDEX: 27.46 KG/M2 | DIASTOLIC BLOOD PRESSURE: 90 MMHG | WEIGHT: 185.44 LBS | HEIGHT: 69 IN | SYSTOLIC BLOOD PRESSURE: 140 MMHG

## 2024-01-19 DIAGNOSIS — D21.9 FIBROIDS: ICD-10-CM

## 2024-01-19 DIAGNOSIS — R87.615 UNSATISFACTORY CERVICAL PAPANICOLAOU SMEAR: Primary | ICD-10-CM

## 2024-01-19 DIAGNOSIS — Z30.41 ENCOUNTER FOR SURVEILLANCE OF CONTRACEPTIVE PILLS: ICD-10-CM

## 2024-01-19 PROCEDURE — 88175 CYTOPATH C/V AUTO FLUID REDO: CPT | Performed by: OBSTETRICS & GYNECOLOGY

## 2024-01-19 PROCEDURE — 3080F DIAST BP >= 90 MM HG: CPT | Mod: CPTII,S$GLB,, | Performed by: OBSTETRICS & GYNECOLOGY

## 2024-01-19 PROCEDURE — 3077F SYST BP >= 140 MM HG: CPT | Mod: CPTII,S$GLB,, | Performed by: OBSTETRICS & GYNECOLOGY

## 2024-01-19 PROCEDURE — 99213 OFFICE O/P EST LOW 20 MIN: CPT | Mod: S$GLB,,, | Performed by: OBSTETRICS & GYNECOLOGY

## 2024-01-19 PROCEDURE — 3008F BODY MASS INDEX DOCD: CPT | Mod: CPTII,S$GLB,, | Performed by: OBSTETRICS & GYNECOLOGY

## 2024-01-19 PROCEDURE — 99999 PR PBB SHADOW E&M-EST. PATIENT-LVL III: CPT | Mod: PBBFAC,,, | Performed by: OBSTETRICS & GYNECOLOGY

## 2024-01-19 PROCEDURE — 1160F RVW MEDS BY RX/DR IN RCRD: CPT | Mod: CPTII,S$GLB,, | Performed by: OBSTETRICS & GYNECOLOGY

## 2024-01-19 PROCEDURE — 1159F MED LIST DOCD IN RCRD: CPT | Mod: CPTII,S$GLB,, | Performed by: OBSTETRICS & GYNECOLOGY

## 2024-01-19 RX ORDER — DROSPIRENONE 4 MG/1
4 TABLET, FILM COATED ORAL DAILY
Qty: 84 TABLET | Refills: 3 | Status: SHIPPED | OUTPATIENT
Start: 2024-01-19

## 2024-01-19 NOTE — PROGRESS NOTES
"Chief Complaint   Patient presents with    Follow-up       HPI:  Julio Cesar Fajardo is a 43 y.o. female patient  who presents today for follow-up pap as well as to discuss her recently diagnosed fibroids.  Pap performed at her annual exam 10/6/23 was unsatisfactory but HPV screening was negative.  At the time her annual exam, her uterus was noted to feel enlarged and a pelvic ultrasound was performed which revealed fibroids (largest 10 cm).  With her elevated BP, she was converted from LoLoEstrin to Slynd progesterone only pills and continues to have essentially no bleeding.  Denies pelvic pressure or pain.  No LMP recorded. (Menstrual status: Birth Control).    Blood pressure (!) 140/90, height 5' 9" (1.753 m), weight 84.1 kg (185 lb 6.5 oz).    Summary:  Pap 09: ASCUS, HPV+  Colpo 09: Ecto: No dysplasia, Ecc: Negative  Pap 6/18/10: LGSIL  Pap 11: LGSIL  Colpo 11: Ecto: EARLINE II, Ecc: Negative  LEEP 10/14/11: Focal atypia  Pap 3/15/13: Negative  Pap 3/19/14: ASCUS, HPV negative  Pap 9/15/17: Negative  Pap 18: Negative  Pap 22: Negative, HPV: Negative  Pap 10/6/23: Unsatisfactory, HPV: Negative     10/18/23 Mammogram: Negative, TC 17.3     23 Pelvic sono:  FINDINGS:  Uterus:  Size: 14 x 12 x 10 cm  Masses: Multiple fibroids largest measures at least 10 cm.  Endometrium: Obscured by the fibroids.  Right ovary:  Size: 3.0 cm  Appearance: Normal  Vascular flow: Normal.  Left ovary:  Size: 4.1 cm  Appearance: Normal  Vascular Flow: Normal.  Free Fluid:  None.  Impression:  Enlarged fibroid uterus         Past Medical History:   Diagnosis Date    Abnormal Pap smear     Abnormal Pap smear of cervix     leep and colpo done       ROS:  GENERAL: Feeling well overall.   SKIN: Denies rash or lesions.   HEAD: Denies head injury or headache.   NODES: Denies enlarged lymph nodes.   CHEST: Denies chest pain or shortness of breath.   CARDIOVASCULAR: Denies palpitations or left sided chest pain. "   ABDOMEN: No abdominal pain, nausea, vomiting or rectal bleeding.   URINARY: No dysuria or hematuria.  REPRODUCTIVE: See HPI.   BREASTS: Denies pain, lumps, or nipple discharge.   HEMATOLOGIC: No easy bruisability or excessive bleeding.   MUSCULOSKELETAL: Denies joint pain or swelling.   NEUROLOGIC: Denies syncope or weakness.   PSYCHIATRIC: Denies depression.    PE:   (chaperone present during entire exam)  APPEARANCE: Well nourished, well developed, in no acute distress.  ABDOMEN: Soft. No tenderness or masses.   VULVA: No lesions. Normal female genitalia.  VAGINA: Moist and well rugated, no abnormal discharge.  CERVIX: No lesions and discharge.  Pap performed      Diagnosis:  1. Unsatisfactory cervical Papanicolaou smear    2. Fibroids    3. Encounter for surveillance of contraceptive pills          PLAN:    Orders Placed This Encounter    Liquid-Based Pap Smear, Screening    drospirenone, contraceptive, (SLYND) 4 mg (28) Tab       Patient was counseled today on her unsatisfactory pap which was repeated today.  We also discussed her large fibroids and management options: 1) conservative observation  2) medical treatment  3) [place]  4) surgical treatment.  She is asymptomatic, without abnormal bleeding, pressure, or pain, and desires continued conservative management.  We discussed repeating the ultrasound in one year for re-evaluation of the fibroids.  We also reviewed her usage of Slynd with which she is doing well.  She was encouraged to see a PCP for evaluation of her BP    Follow-up for annual exam    I spent a total of 26 minutes on the day of the visit.This includes face to face time and non-face to face time preparing to see the patient (eg, review of tests), Obtaining and/or reviewing separately obtained history, Documenting clinical information in the electronic or other health record, Independently interpreting resultsand communicating results to the patient/family/caregiver, or Care  coordination.      This note was created with voice recognition software.  Grammatical, syntax and spelling errors may be inevitable.

## 2024-01-24 ENCOUNTER — PATIENT MESSAGE (OUTPATIENT)
Dept: OBSTETRICS AND GYNECOLOGY | Facility: CLINIC | Age: 44
End: 2024-01-24
Payer: COMMERCIAL

## 2024-01-24 LAB
FINAL PATHOLOGIC DIAGNOSIS: NORMAL
Lab: NORMAL

## 2024-04-28 ENCOUNTER — PATIENT MESSAGE (OUTPATIENT)
Dept: OBSTETRICS AND GYNECOLOGY | Facility: CLINIC | Age: 44
End: 2024-04-28
Payer: COMMERCIAL

## 2024-04-29 ENCOUNTER — TELEPHONE (OUTPATIENT)
Dept: OBSTETRICS AND GYNECOLOGY | Facility: CLINIC | Age: 44
End: 2024-04-29
Payer: COMMERCIAL

## 2024-04-29 RX ORDER — FLUCONAZOLE 150 MG/1
150 TABLET ORAL DAILY
Qty: 1 TABLET | Refills: 0 | Status: SHIPPED | OUTPATIENT
Start: 2024-04-29 | End: 2024-04-30

## 2024-04-29 NOTE — TELEPHONE ENCOUNTER
Dr Alfaro is out of the office until Thursday. I will send the message to his box for review. Please check with the pharmacy tomorrow morning, Selin  ===View-only below this line===      ----- Message -----       From:Julio Cesar Fajardo       Sent:4/28/2024  2:25 PM CDT         To:Bart Alfaro    Subject:Rx    Hello.  I am sending this message to inquire if someone from Dr. Galvan office could send an RX for Diflucan for me.

## 2024-09-13 ENCOUNTER — TELEPHONE (OUTPATIENT)
Dept: PRIMARY CARE CLINIC | Facility: CLINIC | Age: 44
End: 2024-09-13
Payer: COMMERCIAL

## 2024-09-13 ENCOUNTER — OFFICE VISIT (OUTPATIENT)
Dept: PRIMARY CARE CLINIC | Facility: CLINIC | Age: 44
End: 2024-09-13
Payer: COMMERCIAL

## 2024-09-13 ENCOUNTER — LAB VISIT (OUTPATIENT)
Dept: LAB | Facility: HOSPITAL | Age: 44
End: 2024-09-13
Attending: INTERNAL MEDICINE
Payer: COMMERCIAL

## 2024-09-13 VITALS
BODY MASS INDEX: 26.29 KG/M2 | WEIGHT: 177.5 LBS | OXYGEN SATURATION: 98 % | HEIGHT: 69 IN | SYSTOLIC BLOOD PRESSURE: 136 MMHG | RESPIRATION RATE: 16 BRPM | DIASTOLIC BLOOD PRESSURE: 84 MMHG | HEART RATE: 76 BPM

## 2024-09-13 DIAGNOSIS — Z00.00 PREVENTATIVE HEALTH CARE: Primary | ICD-10-CM

## 2024-09-13 DIAGNOSIS — I10 PRIMARY HYPERTENSION: ICD-10-CM

## 2024-09-13 DIAGNOSIS — Z00.00 PREVENTATIVE HEALTH CARE: ICD-10-CM

## 2024-09-13 DIAGNOSIS — Z80.3 FAMILY HISTORY OF BREAST CANCER IN MOTHER: ICD-10-CM

## 2024-09-13 LAB
ALBUMIN SERPL BCP-MCNC: 3.9 G/DL (ref 3.5–5.2)
ALP SERPL-CCNC: 110 U/L (ref 55–135)
ALT SERPL W/O P-5'-P-CCNC: 17 U/L (ref 10–44)
ANION GAP SERPL CALC-SCNC: 6 MMOL/L (ref 8–16)
AST SERPL-CCNC: 19 U/L (ref 10–40)
BILIRUB SERPL-MCNC: 0.5 MG/DL (ref 0.1–1)
BUN SERPL-MCNC: 11 MG/DL (ref 6–20)
CALCIUM SERPL-MCNC: 10.6 MG/DL (ref 8.7–10.5)
CHLORIDE SERPL-SCNC: 112 MMOL/L (ref 95–110)
CHOLEST SERPL-MCNC: 167 MG/DL (ref 120–199)
CHOLEST/HDLC SERPL: 2.1 {RATIO} (ref 2–5)
CO2 SERPL-SCNC: 22 MMOL/L (ref 23–29)
CREAT SERPL-MCNC: 0.9 MG/DL (ref 0.5–1.4)
ERYTHROCYTE [DISTWIDTH] IN BLOOD BY AUTOMATED COUNT: 13.6 % (ref 11.5–14.5)
EST. GFR  (NO RACE VARIABLE): >60 ML/MIN/1.73 M^2
ESTIMATED AVG GLUCOSE: 77 MG/DL (ref 68–131)
GLUCOSE SERPL-MCNC: 82 MG/DL (ref 70–110)
HAV IGM SERPL QL IA: NORMAL
HBA1C MFR BLD: 4.3 % (ref 4–5.6)
HBV CORE IGM SERPL QL IA: NORMAL
HBV SURFACE AG SERPL QL IA: NORMAL
HCT VFR BLD AUTO: 40.9 % (ref 37–48.5)
HCV AB SERPL QL IA: NORMAL
HDLC SERPL-MCNC: 81 MG/DL (ref 40–75)
HDLC SERPL: 48.5 % (ref 20–50)
HGB BLD-MCNC: 13.7 G/DL (ref 12–16)
HIV 1+2 AB+HIV1 P24 AG SERPL QL IA: NORMAL
LDLC SERPL CALC-MCNC: 79.6 MG/DL (ref 63–159)
MAGNESIUM SERPL-MCNC: 2.4 MG/DL (ref 1.6–2.6)
MCH RBC QN AUTO: 28.6 PG (ref 27–31)
MCHC RBC AUTO-ENTMCNC: 33.5 G/DL (ref 32–36)
MCV RBC AUTO: 85 FL (ref 82–98)
NONHDLC SERPL-MCNC: 86 MG/DL
PLATELET # BLD AUTO: 223 K/UL (ref 150–450)
PMV BLD AUTO: 11 FL (ref 9.2–12.9)
POTASSIUM SERPL-SCNC: 4.1 MMOL/L (ref 3.5–5.1)
PROT SERPL-MCNC: 7.6 G/DL (ref 6–8.4)
RBC # BLD AUTO: 4.79 M/UL (ref 4–5.4)
SODIUM SERPL-SCNC: 140 MMOL/L (ref 136–145)
TREPONEMA PALLIDUM IGG+IGM AB [PRESENCE] IN SERUM OR PLASMA BY IMMUNOASSAY: NONREACTIVE
TRIGL SERPL-MCNC: 32 MG/DL (ref 30–150)
TSH SERPL DL<=0.005 MIU/L-ACNC: 1.49 UIU/ML (ref 0.4–4)
WBC # BLD AUTO: 5.4 K/UL (ref 3.9–12.7)

## 2024-09-13 PROCEDURE — 3075F SYST BP GE 130 - 139MM HG: CPT | Mod: CPTII,S$GLB,, | Performed by: INTERNAL MEDICINE

## 2024-09-13 PROCEDURE — 84443 ASSAY THYROID STIM HORMONE: CPT | Performed by: INTERNAL MEDICINE

## 2024-09-13 PROCEDURE — 85027 COMPLETE CBC AUTOMATED: CPT | Performed by: INTERNAL MEDICINE

## 2024-09-13 PROCEDURE — 1159F MED LIST DOCD IN RCRD: CPT | Mod: CPTII,S$GLB,, | Performed by: INTERNAL MEDICINE

## 2024-09-13 PROCEDURE — 86696 HERPES SIMPLEX TYPE 2 TEST: CPT | Performed by: INTERNAL MEDICINE

## 2024-09-13 PROCEDURE — 3066F NEPHROPATHY DOC TX: CPT | Mod: CPTII,S$GLB,, | Performed by: INTERNAL MEDICINE

## 2024-09-13 PROCEDURE — 3079F DIAST BP 80-89 MM HG: CPT | Mod: CPTII,S$GLB,, | Performed by: INTERNAL MEDICINE

## 2024-09-13 PROCEDURE — 99386 PREV VISIT NEW AGE 40-64: CPT | Mod: S$GLB,,, | Performed by: INTERNAL MEDICINE

## 2024-09-13 PROCEDURE — 3044F HG A1C LEVEL LT 7.0%: CPT | Mod: CPTII,S$GLB,, | Performed by: INTERNAL MEDICINE

## 2024-09-13 PROCEDURE — 87389 HIV-1 AG W/HIV-1&-2 AB AG IA: CPT | Performed by: INTERNAL MEDICINE

## 2024-09-13 PROCEDURE — 80053 COMPREHEN METABOLIC PANEL: CPT | Performed by: INTERNAL MEDICINE

## 2024-09-13 PROCEDURE — 86695 HERPES SIMPLEX TYPE 1 TEST: CPT | Performed by: INTERNAL MEDICINE

## 2024-09-13 PROCEDURE — 83036 HEMOGLOBIN GLYCOSYLATED A1C: CPT | Performed by: INTERNAL MEDICINE

## 2024-09-13 PROCEDURE — 80074 ACUTE HEPATITIS PANEL: CPT | Performed by: INTERNAL MEDICINE

## 2024-09-13 PROCEDURE — 3061F NEG MICROALBUMINURIA REV: CPT | Mod: CPTII,S$GLB,, | Performed by: INTERNAL MEDICINE

## 2024-09-13 PROCEDURE — 86593 SYPHILIS TEST NON-TREP QUANT: CPT | Performed by: INTERNAL MEDICINE

## 2024-09-13 PROCEDURE — 93005 ELECTROCARDIOGRAM TRACING: CPT | Mod: S$GLB,,, | Performed by: INTERNAL MEDICINE

## 2024-09-13 PROCEDURE — 99999 PR PBB SHADOW E&M-EST. PATIENT-LVL V: CPT | Mod: PBBFAC,,, | Performed by: INTERNAL MEDICINE

## 2024-09-13 PROCEDURE — 93010 ELECTROCARDIOGRAM REPORT: CPT | Mod: S$GLB,,, | Performed by: INTERNAL MEDICINE

## 2024-09-13 PROCEDURE — 80061 LIPID PANEL: CPT | Performed by: INTERNAL MEDICINE

## 2024-09-13 PROCEDURE — 3008F BODY MASS INDEX DOCD: CPT | Mod: CPTII,S$GLB,, | Performed by: INTERNAL MEDICINE

## 2024-09-13 PROCEDURE — 83735 ASSAY OF MAGNESIUM: CPT | Performed by: INTERNAL MEDICINE

## 2024-09-13 RX ORDER — MULTIVITAMIN
1 TABLET ORAL DAILY
COMMUNITY

## 2024-09-13 RX ORDER — AMLODIPINE BESYLATE 2.5 MG/1
2.5 TABLET ORAL DAILY
Qty: 90 TABLET | Refills: 3 | Status: SHIPPED | OUTPATIENT
Start: 2024-09-13 | End: 2025-09-13

## 2024-09-13 NOTE — ASSESSMENT & PLAN NOTE
Labs today  Cont to regular exercise 30 min 5 times per week   Get COVID and Flu vaccines   TTE/EKG  MMG in 10/2024  Genetics referral  Virtual visit in 4 wks for BP  Refer for colonoscopy

## 2024-09-13 NOTE — PROGRESS NOTES
[facility] Progress Note      Chief Complaint     Establish Care and Hypertension   for HTN     Subjective:      History of Present Illness:  Julio Cesar Fajardo is a 44 y.o. female     HTN: has lost 8 lbs approx 8 wks ago; tries to exercise 30 min Peloton 3 days and 30 min weight training 3 days per week  -checks Bps in her office 130/80s    2023 by [facility]  Multiple fibroids with largest 10cm     OCP: now on drospirenone since 2024 given hx HTN; no cycles     FH: dad and brother HTN, CHF; mom (teacher)breast cancer 45 y/o; brother  41 y/o aortic aneurysm   SH: last SO 8 mo ago - not using condoms always; dentist on [address] ([place]olo)- one Saturday per month; has no kids; social alcohol, no tobacco, drugs     Past Medical History:  Past Medical History:   Diagnosis Date    Abnormal Pap smear     Abnormal Pap smear of cervix     leep and colpo done       Past Surgical History:  Past Surgical History:   Procedure Laterality Date    CERVICAL BIOPSY  W/ LOOP ELECTRODE EXCISION      COLPOSCOPY         Allergies:  Review of patient's allergies indicates:   Allergen Reactions    Neosporin (neomycin-polymyx)        Home Medications:  Current Outpatient Medications   Medication Sig Dispense Refill    drospirenone, contraceptive, (SLYND) 4 mg (28) Tab Take 1 tablet (4 mg total) by mouth Daily. 84 tablet 3    multivitamin (THERAGRAN) per tablet Take 1 tablet by mouth once daily.      amLODIPine (NORVASC) 2.5 MG tablet Take 1 tablet (2.5 mg total) by mouth once daily. 90 tablet 3     No current facility-administered medications for this visit.        Family History:  Family History   Problem Relation Name Age of Onset    Breast cancer Mother Raoul Fajardo 40    Colon cancer Neg Hx      Ovarian cancer Neg Hx         Social History:  Social History     Tobacco Use    Smoking status: Never    Smokeless tobacco: Never   Substance Use Topics    Alcohol use: Yes    Drug use: No       Review of Systems:  Pertinent  "positives and negatives listed in HPI. All other systems are reviewed and are negative.    Health Maintaince :   Health Maintenance Topics with due status: Not Due       Topic Last Completion Date    Cervical Cancer Screening 01/19/2024    Hemoglobin A1c (Diabetic Prevention Screening) 09/13/2024    Lipid Panel 09/13/2024           Eye:   Dental:     Immunizations:   Tdap: .  Influenza: needs.  COVID: needs  Hepatitis C:   Cancer Screening:  PAP: UTD 10/2023 with Dr. Anne   Mammogram: UTD needs  Colonoscopy: needs to schedule   DEXA:  n/a  LDCT: n/a    The 10-year ASCVD risk score (Faviola REEVES, et al., 2019) is: 1.4%    Values used to calculate the score:      Age: 44 years      Sex: Female      Is Non- : Yes      Diabetic: No      Tobacco smoker: No      Systolic Blood Pressure: 136 mmHg      Is BP treated: Yes      HDL Cholesterol: 81 mg/dL      Total Cholesterol: 167 mg/dL      Objective:   /84 (BP Location: Right arm, Patient Position: Sitting, BP Method: Medium (Manual))   Pulse 76   Resp 16   Ht 5' 9" (1.753 m)   Wt 80.5 kg (177 lb 7.5 oz)   SpO2 98%   BMI 26.21 kg/m²      Body mass index is 26.21 kg/m².       Physical Examination:  General: Alert and awake in no apparent distress  HEENT: Normocephalic and atraumatic; Tms WNL  Eyes:  PERRL; EOMi with anicteric sclera and clear conjunctivae  Mouth:  Oropharynx clear and without exudate; moist mucous membranes  Neck:   Cervical nodes not enlarged (No submental, submandibular, preauricular, posterior auricular or occipital adenopathy); supple; no bruits  Cardio:  Regular rate and rhythm with normal S1 and S2; no murmurs or rubs  Resp:  CTAB; respirations unlabored; no wheezes, crackles or rhonchi  Abdom: Soft, NTND with normoactive bowel sounds; negative HSM  Extrem: Warm and well-perfused with no clubbing, cyanosis or edema  Skin:  No rashes, lesions, or color changes  Pulses:  2+ and symmetric distally  Neuro:  AAOx3; " cooperative and pleasant with no focal deficits    Laboratory:      Most Recent Data:  Lab Results   Component Value Date    WBC 5.40 09/13/2024    HGB 13.7 09/13/2024    HCT 40.9 09/13/2024     09/13/2024    CHOL 167 09/13/2024    TRIG 32 09/13/2024    HDL 81 (H) 09/13/2024    ALT 17 09/13/2024    AST 19 09/13/2024     09/13/2024    K 4.1 09/13/2024     (H) 09/13/2024    BUN 11 09/13/2024    CO2 22 (L) 09/13/2024    TSH 1.488 09/13/2024    INR 1.0 09/08/2011    HGBA1C 4.3 09/13/2024              CBC:   WBC   Date Value Ref Range Status   09/13/2024 5.40 3.90 - 12.70 K/uL Final     Hemoglobin   Date Value Ref Range Status   09/13/2024 13.7 12.0 - 16.0 g/dL Final     Hematocrit   Date Value Ref Range Status   09/13/2024 40.9 37.0 - 48.5 % Final     Platelets   Date Value Ref Range Status   09/13/2024 223 150 - 450 K/uL Final     MCV   Date Value Ref Range Status   09/13/2024 85 82 - 98 fL Final     RDW   Date Value Ref Range Status   09/13/2024 13.6 11.5 - 14.5 % Final     BMP:   Sodium   Date Value Ref Range Status   09/13/2024 140 136 - 145 mmol/L Final     Potassium   Date Value Ref Range Status   09/13/2024 4.1 3.5 - 5.1 mmol/L Final     Chloride   Date Value Ref Range Status   09/13/2024 112 (H) 95 - 110 mmol/L Final     CO2   Date Value Ref Range Status   09/13/2024 22 (L) 23 - 29 mmol/L Final     BUN   Date Value Ref Range Status   09/13/2024 11 6 - 20 mg/dL Final     Creatinine   Date Value Ref Range Status   09/13/2024 0.9 0.5 - 1.4 mg/dL Final     Glucose   Date Value Ref Range Status   09/13/2024 82 70 - 110 mg/dL Final     Calcium   Date Value Ref Range Status   09/13/2024 10.6 (H) 8.7 - 10.5 mg/dL Final     Magnesium   Date Value Ref Range Status   09/13/2024 2.4 1.6 - 2.6 mg/dL Final     LFTs:   Total Protein   Date Value Ref Range Status   09/13/2024 7.6 6.0 - 8.4 g/dL Final     Albumin   Date Value Ref Range Status   09/13/2024 3.9 3.5 - 5.2 g/dL Final     Total Bilirubin   Date  "Value Ref Range Status   09/13/2024 0.5 0.1 - 1.0 mg/dL Final     Comment:     For infants and newborns, interpretation of results should be based  on gestational age, weight and in agreement with clinical  observations.    Premature Infant recommended reference ranges:  Up to 24 hours.............<8.0 mg/dL  Up to 48 hours............<12.0 mg/dL  3-5 days..................<15.0 mg/dL  6-29 days.................<15.0 mg/dL       AST   Date Value Ref Range Status   09/13/2024 19 10 - 40 U/L Final     Alkaline Phosphatase   Date Value Ref Range Status   09/13/2024 110 55 - 135 U/L Final     ALT   Date Value Ref Range Status   09/13/2024 17 10 - 44 U/L Final     Coags:   INR   Date Value Ref Range Status   09/08/2011 1.0 0.8 - 1.2 Final     Comment:     ACCP Guideline for Coumadin usage recommends a "target range" of  2.0 - 3.0 for INR for all indicators except mechanical heart valves  and antiphospholipid syndromes which should use 2.5 - 3.5.  .     FLP:      Lab Results   Component Value Date    CHOL 167 09/13/2024     Lab Results   Component Value Date    HDL 81 (H) 09/13/2024     Lab Results   Component Value Date    LDLCALC 79.6 09/13/2024     Lab Results   Component Value Date    TRIG 32 09/13/2024     Lab Results   Component Value Date    CHOLHDL 48.5 09/13/2024      DM:      Hemoglobin A1C   Date Value Ref Range Status   09/13/2024 4.3 4.0 - 5.6 % Final     Comment:     ADA Screening Guidelines:  5.7-6.4%  Consistent with prediabetes  >or=6.5%  Consistent with diabetes    High levels of fetal hemoglobin interfere with the HbA1C  assay. Heterozygous hemoglobin variants (HbS, HgC, etc)do  not significantly interfere with this assay.   However, presence of multiple variants may affect accuracy.       LDL Cholesterol   Date Value Ref Range Status   09/13/2024 79.6 63.0 - 159.0 mg/dL Final     Comment:     The National Cholesterol Education Program (NCEP) has set the  following guidelines (reference values) for LDL " "Cholesterol:  Optimal.......................<130 mg/dL  Borderline High...............130-159 mg/dL  High..........................160-189 mg/dL  Very High.....................>190 mg/dL       Creatinine   Date Value Ref Range Status   09/13/2024 0.9 0.5 - 1.4 mg/dL Final     Thyroid:   TSH   Date Value Ref Range Status   09/13/2024 1.488 0.400 - 4.000 uIU/mL Final     Anemia: No results found for: "IRON", "TIBC", "FERRITIN", "[id number]", "FOLATE"  Cardiac: No results found for: "TROPONINI", "CKTOTAL", "CKMB", "BNP"  Urinalysis:   Color, UA   Date Value Ref Range Status   09/13/2024 Yellow Yellow, Straw, Greer Final     Specific Gravity, UA   Date Value Ref Range Status   09/13/2024 1.025 1.005 - 1.030 Final     Nitrite, UA   Date Value Ref Range Status   09/13/2024 Negative Negative Final     Ketones, UA   Date Value Ref Range Status   09/13/2024 Negative Negative Final     WBC, UA   Date Value Ref Range Status   09/13/2024 2 0 - 5 /hpf Final       Other Results:  EKG (my interpretation):     Radiology:  US Pelvis Comp with Transvag NON-OB (xpd  Narrative: EXAMINATION:  US PELVIS COMP WITH TRANSVAG NON-OB (XPD)    CLINICAL HISTORY:  Hypertrophy of uterus    TECHNIQUE:  Transabdominal sonography of the pelvis was performed, followed by transvaginal sonography to better evaluate the uterus and ovaries.    COMPARISON:  None.    FINDINGS:  Uterus:    Size: 14 x 12 x 10 cm    Masses: Multiple fibroids largest measures at least 10 cm.    Endometrium: Obscured by the fibroids.    Right ovary:    Size: 3.0 cm    Appearance: Normal    Vascular flow: Normal.    Left ovary:    Size: 4.1 cm    Appearance: Normal    Vascular Flow: Normal.    Free Fluid:    None.  Impression: Enlarged fibroid uterus    Electronically signed by: Bradley Silveira Jr  Date:    12/09/2023  Time:    16:07          Assessment/Plan     Julio Cesar Fajardo is a 44 y.o. female with:  1. Preventative health care  Assessment & Plan:  Labs today  Cont to regular " exercise 30 min 5 times per week   Get COVID and Flu vaccines   TTE/EKG  MMG in 10/2024  Genetics referral  Virtual visit in 4 wks for BP  Refer for colonoscopy     Orders:  -     Magnesium; Future; Expected date: 2024  -     Hemoglobin A1C; Future; Expected date: 2024  -     Microalbumin/Creatinine Ratio, Urine; Future; Expected date: 2024  -     Urinalysis; Future; Expected date: 2024  -     Comprehensive Metabolic Panel; Future; Expected date: 2024  -     CBC Without Differential; Future; Expected date: 2024  -     Lipid Panel; Future; Expected date: 2024  -     HIV 1/2 Ag/Ab (4th Gen); Future; Expected date: 2024  -     C. trachomatis/N. gonorrhoeae by AMP DNA [facility]; Urine; Future; Expected date: 2024  -     HSV 1 & 2, IgG; Future  -     Hepatitis Panel, Acute; Future; Expected date: 2024  -     TSH; Future; Expected date: 2024  -     Treponema Pallidium Antibodies IgG, IgM; Future; Expected date: 2024  -     Ambulatory referral/consult to Endo Procedure ; Future; Expected date: 2024  -     Cancel: Mammo Digital Screening Bilat w/ Jose Carlos; Future; Expected date: 2024  -     Mammo Digital Screening Bilat w/ Jose Carlos; Future; Expected date: 10/19/2024    2. Family history of breast cancer in mother  Overview:  Mom  early 40s    Assessment & Plan:  Refer to genetics     Orders:  -     Ambulatory referral/consult to Genetics; Future; Expected date: 2024    3. Primary hypertension  Assessment & Plan:  -Check Bps at home weekly and prn symptoms for goal BP <130/80.  Avoid Haney's table salt; use Mrs. Garcia or Dejuan Craig no salt   -Start healthy diet high in fiber (25-35 gm daily), low fat dairy, lean protein, low in saturated/trans fat (minimize cheese, creamy salad dressings); high in calcium/magnesium/potassium; 1.5 gm sodium diet; low in processed sugars and foods, ie  WHITE sugars, bread, pasta, rice, ice cream,  cookies, cake, doughnuts  -Drink 6-8 glasses of water daily  -Moderate exercise 30 min 5 times per week or 75 min intense exercise biweekly   -Start 8-10 hour intermittent fasting diet   -Avoid eating 3 hours prior to bedtime  -Reduce alcohol to 1-2 servings (1 serving = 1 oz wine, 1 oz hard liquor, 12 oz beer) per day  -Avoid smoking, vaping, stimulant drugs/mediations ie illicit drugs, pseudo-ephedrine  -Minimize NSAIDs    Start norvasc 2.5mg po daily   Schedule TTE  EKG Today  Virtual visit in 4 wks with BP log       Orders:  -     IN OFFICE EKG 12-LEAD (to [place])  -     Echo Saline Bubble? No; Ultrasound enhancing contrast? No; Future; Expected date: 09/13/2024  -     amLODIPine (NORVASC) 2.5 MG tablet; Take 1 tablet (2.5 mg total) by mouth once daily.  Dispense: 90 tablet; Refill: 3             I spent a total of 43 minutes on the day of the visit.This includes face to face time and non-face to face time preparing to see the patient (eg, review of tests), obtaining and/or reviewing separately obtained history, documenting clinical information in the electronic or other health record, independently interpreting results and communicating results to the patient/family/caregiver, or care coordinator.   Code Status:     Johanne Prakash MD History of Present Illness

## 2024-09-13 NOTE — PATIENT INSTRUCTIONS
Labs today  Cont to regular exercise 30 min 5 times per week   Get COVID and Flu vaccines     HTN med:  Norvasc 2.5mg po in am - virtual visit in 4 wks     Heart US: at [facility]  [facility] at main 10/18/2024    Dr. Prakash [phone]     -Check Bps at home weekly and prn symptoms for goal BP <130/80.  Avoid Haney's table salt; use Mrs. Garcia or Dejuan Craig no salt   -Start healthy diet high in fiber (25-35 gm daily), low fat dairy, lean protein, low in saturated/trans fat (minimize cheese, creamy salad dressings); high in calcium/magnesium/potassium; 1.5 gm sodium diet; low in processed sugars and foods, ie  WHITE sugars, bread, pasta, rice, ice cream, cookies, cake, doughnuts  -Drink 6-8 glasses of water daily  -Moderate exercise 30 min 5 times per week or 75 min intense exercise biweekly   -Start 8-10 hour intermittent fasting diet   -Avoid eating 3 hours prior to bedtime  -Reduce alcohol to 1-2 servings (1 serving = 1 oz wine, 1 oz hard liquor, 12 oz beer) per day  -Avoid smoking, vaping, stimulant drugs/mediations ie illicit drugs, pseudo-ephedrine  -Minimize NSAIDs     Diet should be high in fiber with 30-35 gms daily, complex carbs, low saturated/trans fat diet,  Avoid fast foods, sweetened drinks, processed , white bread/pasta/rice/potatoes.  Hydate with 6-8 glasses of water daily.exercise 30 min 5 times per week      Lower your bad LDL cholesterol and increase your good HDL ideally to >60 to lower your risk of cardiovascular disease:  Exercise 150 min per week, eat foods rich in mono-and poly-unsaturated fats (fatty fish like salmon, mackerel, rainbow trout, albacore tuna, avocados, almonds/hazelnuts/pecans, peanut butter, sesame/pumpkin seeds,  olive/canola/sunflower/safflower/peanut oil), 5 servings of fruits and veggies per day,  stop smoking,  moderate alcohol 1-2 drinks per day, 5-10% weight loss.     Avoid saturated/trans fats found in shortening, ie cakes, cookies, fried foods, margarines, full-fat dairy, red  meat.

## 2024-09-13 NOTE — ASSESSMENT & PLAN NOTE
-Check Bps at home weekly and prn symptoms for goal BP <130/80.  Avoid Haney's table salt; use Mrs. Garcia or Dejuan Craig no salt   -Start healthy diet high in fiber (25-35 gm daily), low fat dairy, lean protein, low in saturated/trans fat (minimize cheese, creamy salad dressings); high in calcium/magnesium/potassium; 1.5 gm sodium diet; low in processed sugars and foods, ie  WHITE sugars, bread, pasta, rice, ice cream, cookies, cake, doughnuts  -Drink 6-8 glasses of water daily  -Moderate exercise 30 min 5 times per week or 75 min intense exercise biweekly   -Start 8-10 hour intermittent fasting diet   -Avoid eating 3 hours prior to bedtime  -Reduce alcohol to 1-2 servings (1 serving = 1 oz wine, 1 oz hard liquor, 12 oz beer) per day  -Avoid smoking, vaping, stimulant drugs/mediations ie illicit drugs, pseudo-ephedrine  -Minimize NSAIDs    Start norvasc 2.5mg po daily   Schedule TTE  EKG Today  Virtual visit in 4 wks with BP log

## 2024-09-14 LAB
HSV1 IGG SERPL QL IA: NEGATIVE
HSV2 IGG SERPL QL IA: NEGATIVE

## 2024-09-16 LAB
OHS QRS DURATION: 72 MS
OHS QTC CALCULATION: 400 MS

## 2024-10-04 NOTE — PROGRESS NOTES
Cancer Genetics  Hereditary and High-Risk Clinic  Department of Hematology and Oncology  [facility]    Date of Service:  10/11/24  Visit Provider:  Phoebe Zayas [facility]    Patient ID  Name: Julio Cesar Fajardo    : 1980    MRN: [id number]      Referring Provider  Johanne Prakash MD  [address]  [place] [address]    Televisit Information  The patient location is: [place], [place].    The chief complaint leading to consultation is:  As below.    Visit type: In- person.      Face-to-face time with patient:  Approximately 40 minutes.    Approximately 60 minutes in total were spent on this encounter, which includes face-to-face time and non-face-to-face time preparing to see the patient (e.g., review of records and tests), obtaining and/or reviewing separately obtained history, documenting clinical information in the electronic or other health record, independently interpreting results (not separately reported) and communicating results to the patient/family/caregiver, or care coordination (not separately reported).  Each patient to whom he or she provides medical services by telemedicine is:  (1) informed of the relationship between the physician and patient and the respective role of any other health care provider with respect to management of the patient; and (2) notified that he or she may decline to receive medical services by telemedicine and may withdraw from such care at any time.    IMPRESSION   Ms. Cheatham is a pleasant 44 year old woman. Today we discussed her personal health history, family health history, and the purpose and logistics of genetic testing. The patient has a history of a cervical biopsy with loop electrode excision due to an abnormal pap smear and uterine fibroids. Her most recent pap smear on 2024 was normal. Her family history is significant for her mother diagnosed with breast cancer at 40 years old, her maternal aunt with a breast lesion (possible  "stage 0 or stage 1 breast cancer per patient report) at 70 yeas old, and her paternal aunt diagnosed with stomach cancer in her 70's. Ms. Cheatham meets NCCN guidelines for genetic testing and elected to proceed with the CancerNext - Expanded + RNAinsight panel through [employer].     We also discussed her family history of hemophilia. She reported her maternal grandfather and 2 maternal first-cousins having a diagnosis of hemophilia. I explained that hemophilia can be genetic, but she would need an evaluation with the general genetic counselor at [facility] to discuss her risks. To do so, a referral would need to be placed a physician.     Ms. Cheatham's brother passed from an aortic aneurysm at 44 years old. She has shared this history with her primary care physician who ordered an EKG for evaluation.    FOCUSED PERSONAL HISTORY   Chief Complaint: Genetic Evaluation (Family History of breast cancer)    History of Present Illness (HPI):  Chaparrita Fajardo ("Chaparrita"), 44 y.o., assigned female sex at birth, is new to the [facility] Department of Hematology and Oncology and to me.  She was referred by Primary Care for hereditary cancer risk assessment given her family history of breast cancer.    Cancer History  No personal history of cancer  Patient reported precancerous cells on the cervix and uterine fibroids  Adrian-Valdemar 8: 17.76% lifetime risk of breast cancer    Focused Medical History  Previous germline cancer genetic testing:  No  Colonoscopy: No  Mammogram: Yes  Most recent mammogram: 10/06/2023  Breast MRI:  No  Pancreatitis:  No    Gynecologic History  Age at menarche:  12 years  Age at first live childbirth:  nulliparous  Menopausal status:  premenopausal  Reproductive organs:  Intact    Hormone Use  No history of hormone use    Tobacco Use  Tobacco Use: Low Risk  (10/11/2024)    Patient History     Smoking Tobacco Use: Never     Smokeless Tobacco Use: Never     Passive Exposure: Not on file       FAMILY HISTORY "             Ms. Cheatham reported a family history of hemophilia and aortic aneurysm. We discussed this history. I recommended she also discuss this history with her primary care physician so appropriate referrals, evaluations and follow-up can be made. A family history of other birth defects, intellectual disability, SIDS, sudden early death, multiple miscarriages and consanguinity were denied. Please refer to above pedigree for further details. A larger copy has been scanned in the Media tab.     DISCUSSION   Causes of Cancer:  Cancer occurs when cells grow out of control. Some genes help protect against cancer by controlling the growth of cells. However, mutations (problems) in these genes can prevent them from working properly, increasing the risk of developing cancer.  Sporadic Cancer: Most people who get cancer have sporadic cancer. Sporadic cancer is caused by mutations that occur during the lifetime. These mutations may be caused by aging, environmental exposures (ex. UV radiation, carcinogens), lifestyle (ex. smoking, drinking alcohol, sunbathing, poor diet), other medical conditions (ex. hepatitis, HPV, ulcerative colitis), or other factors.   Hereditary Cancer: A small percentage (5-10%) of people who develop cancer were born with a mutation already in one of the cancer protection genes.  Familial Cancer: Cancer can also cluster in families that do not have an identifiable mutation. This may be due to shared environmental or lifestyle factors or genetic risk factors that have not been identified or cannot be detected using current technology or panels. '    Chaparrita meets NCCN criteria for genetic testing based on her mother's diagnosis of breast cancer at 40 years old. Therefore, she was offered family history focused panel and broad panel testing. Chaparrita opted for the CancerNext-Expanded + [employer] panel through [facility]. The following 71  genes associated with brain, breast, colon, ovarian,  pancreatic, prostate, renal, uterine, and other cancers are included on this panel: AIP, ALK, APC, RUSTY, AXIN2, BAP1, BARD1, BMPR1A, BRCA1, BRCA2, BRIP1, CDC73, CDH1, CDK4, CDKN1B, CDKN2A, CHEK2, CTNNA1, DICER1, EGFR, EGLN1, EPCAM, FH, FLCN, GREM1, HOXB13, KIF1B, KIT, LZTR1, MAX, MEN1, MET, MITF, MLH1, MSH2, MSH3, MSH6, MUTYH, NF1, NF2, NTHL1, PALB2, PDGFRA, PHOX2B, PMS2, POLD1, POLE, POT1, LNXBC9Y, PTCH1, PTEN, RAD51C, RAD51D, RB1, RET, SDHA, SDHAF2, SDHB, SDHC, SDHD, SMAD4, SMARCA4, SMARCB1, SMARCE1, STK11, SUFU, FXXE790, TP53, TSC1, TSC2, VHL.    Most Informative Person to Test: Genetic testing usually provides the most information when completed for the family member who is most likely to test positive. This would be someone who had a personal history of suspicious cancer(s) (based on type, age, or number). If this individual tests negative, it is very unlikely that others in the family would have a hereditary cancer risk (unless others also have a suspicious personal history). If this family member tests positive, then testing would be recommended for other relatives.     If someone who does not have a personal history of suspicious cancer has genetic testing, a negative result is much more difficult to interpret (unless there is a known hereditary cancer predisposition in the family). There are several possibilities for a negative result in this situation:    The cancer in the family could be due to a hereditary cancer risk that this individual did not inherit. In this case, a negative result would likely reduce the risk of related cancers.  The cancer in the family may not be due to an identifiable hereditary cancer risk. The cancers in the family may be related to shared environmental or lifestyle factors or a genetic factor that cannot be identified by the test completed using current technology. As a result, the risk of cancer may still be increased based on the family history.    In Chaparrita's family, the  most appropriate individual to have genetic testing would be her mother. Unfortunately, that individual passed. We discussed that Chaparrita could undergo genetic testing, with the limitation that a negative result will not provide as much information about her cancer risks.      We discussed the psychosocial implications of a positive result, including anxiety, fear and guilt. Chaparrita did not express any concern.    Possible Results:    Positive (pathogenic or likely pathogenic variant): A genetic variant was found that is suspected or known to impact the function of the gene. The impact of a positive result on an individual's risk of cancer varies based on the gene, specific variant, individual's sex assigned at birth, personal cancer history, other health history (such as surgical history), and family history. A positive result can impact screening and risk management recommendations. However, there are not always available guidelines for management based on a specific gene variant. Family history and personal risk factors should always be considered. Sometimes, a positive result can also have treatment or reproductive implications.   Negative: No clinically significant variants were reported in the tested genes. A negative result does not indicate that an individual cannot develop cancer or even that the individual is at average risk. An individual may still be at an increased risk for cancer based on personal risk factors or family history. Additionally, there could be a hereditary cancer predisposition that was not included in a chosen panel or is not detected with current technology.   Variant of Uncertain Significance (VUS): A variant was found. However, the lab does not have enough information to determine if the variant is benign (harmless) or pathogenic (impacts the function of the gene). The laboratory may update (reclassify) the variant over time as more information becomes available. When reclassified, most  variants of uncertain significance are reclassified to benign/likely benign. Typically, it is not recommended to  based on the presence of a VUS. The chance of finding a VUS varies based on the test performed. Generally, the chance of finding a VUS increases with the number of genes tested and decreases with the amount of testing of that gene (genes that are tested more frequently or for a longer period of time have a lower VUS rate).    Genetic Mutation Inheritance:  When an individual has a gene mutation, their first-degree relatives (parents, children, and siblings) each have a 50% chance of carrying the same mutation. Other, more distant blood relatives can also be at risk of carrying the same mutation. At-risk relatives of an individual with a mutation should consider genetic testing to help determine their risk for cancer.     Genetic Discrimination: The Genetic Information Nondiscrimination Act of 2008 (ALTHEA) is a [place] federal law that provides some protections against the use of an individual's genetic information by their health insurer and by their employer. Title I of ALTHEA prohibits most health insurers (except for insurance obtained through a job with the  or the Federal Employees Health Benefits Plan) from utilizing an individual's genetic information to make decisions regarding insurance eligibility or premium charges. Title II of ALTHEA prohibits covered entities from requesting or requiring the genetic information of employees and applicants and from using said information to make employment decisions. This does not apply to employers with fewer than 15 employees or to the .  ALTHEA also does not protect individuals from genetic discrimination by any other type of policy or entity, including but not limited to life insurance, disability insurance, long-term care insurance,  benefits, and [facility] benefits.    There is also a possibility for the  "patient to incur out-of-pocket costs related to this testing. Chaparrita appeared to have a good understanding of the information as she asked appropriate questions.  Chaparrita received comprehensive counseling regarding panel testing and has elected to proceed with this testing. A sample was scheduled to be submitted on 10/11/2024 to [facility].  Chaparrita's results should be available in approximately 3 weeks. In the meantime, she is welcome to contact me if she has any questions, concerns, or updates to her family history.     Breast Cancer Risk Stratification   Current, Estimated Breast Cancer Risk Model Used Patient's Score Patient's Risk Category   10-year Tyrer-Cuzick v8.0b 4.2%  [x] <5%   [] >=5%    Lifetime (to age 85) Tyrer-Cuzick v8.0b 17.76%  [] Average risk (<15%)   [x] Intermediate risk (>=15% - <20%)   [] Increased risk (>=20%)       ASSESSMENT / PLAN      Chaparrita Fajardo ("Chaparrita"), 44 y.o., presented today for hereditary cancer risk assessment and genetic counseling given her mother's diagnosis of breast cancer at 40 years old.        ICD-10-CM ICD-9-CM   1. Encounter for nonprocreative genetic counseling  Z71.83 V26.33   2. Family history of breast cancer in mother  Z80.3 V16.3       Genetic Test Information  Testing lab: [facility]   Test panel: CancerNext - Expanded + RNAinsight (Core:  BRCA1/2)   ICD-10 code(s): Z80.3, z80.0   Verbal informed consent: Obtained   Written informed consent: Obtained   Specimen type: Blood  (Patient denies blood disorders that would necessitate a skin fibroblast specimen)   Specimen collection by: [facility]   Specimen collection date: 10/11/2024   Results expected by: Approximately 2-3 weeks after the genetic testing lab receives the specimen   Results disclosure plan: Post-test visit if positive or complex result; otherwise, results will be communicated through phone call       Follow-up:   I will call the patient in 3-4 weeks with the results of her genetic " test. If the result is positive, a follow up appointment will be scheduled to discuss the details of the finding.    Questions were encouraged and answered to the patient's satisfaction, and she verbalized understanding of the information and agreement with the plan.         Approximately 40 minutes were spent face-to-face with the patient.  Approximately 60 minutes in total were spent on this encounter, which includes face-to-face time and non-face-to-face time preparing to see the patient (e.g., review of tests), obtaining and/or reviewing separately obtained history, documenting clinical information in the electronic or other health record, independently interpreting results (not separately reported) and communicating results to the patient/family/caregiver, or care coordination (not separately reported).     This assessment is based on the history and reports provided, as well as the current scientific knowledge regarding cancer genetics.         Phoebe Zayas, [facility]  Genetic Counselor, Hereditary and High-Risk Clinic  Department of Hematology and Oncology  [facility]        CC:  Dr. Johanne Prakash

## 2024-10-11 ENCOUNTER — LAB VISIT (OUTPATIENT)
Dept: LAB | Facility: HOSPITAL | Age: 44
End: 2024-10-11
Payer: COMMERCIAL

## 2024-10-11 ENCOUNTER — OFFICE VISIT (OUTPATIENT)
Dept: HEMATOLOGY/ONCOLOGY | Facility: CLINIC | Age: 44
End: 2024-10-11
Payer: COMMERCIAL

## 2024-10-11 DIAGNOSIS — Z80.3 FAMILY HISTORY OF BREAST CANCER IN MOTHER: ICD-10-CM

## 2024-10-11 DIAGNOSIS — Z71.83 ENCOUNTER FOR NONPROCREATIVE GENETIC COUNSELING: Primary | Chronic | ICD-10-CM

## 2024-10-11 DIAGNOSIS — Z71.83 ENCOUNTER FOR NONPROCREATIVE GENETIC COUNSELING: Chronic | ICD-10-CM

## 2024-10-11 LAB — MISCELLANEOUS GENETIC TEST NAME: NORMAL

## 2024-10-11 PROCEDURE — 36415 COLL VENOUS BLD VENIPUNCTURE: CPT | Performed by: GENETIC COUNSELOR, MS

## 2024-10-11 PROCEDURE — 99999 PR PBB SHADOW E&M-EST. PATIENT-LVL III: CPT | Mod: PBBFAC,,,

## 2024-10-15 ENCOUNTER — OFFICE VISIT (OUTPATIENT)
Dept: PRIMARY CARE CLINIC | Facility: CLINIC | Age: 44
End: 2024-10-15
Payer: COMMERCIAL

## 2024-10-15 ENCOUNTER — PATIENT MESSAGE (OUTPATIENT)
Dept: PRIMARY CARE CLINIC | Facility: CLINIC | Age: 44
End: 2024-10-15

## 2024-10-15 DIAGNOSIS — I10 PRIMARY HYPERTENSION: Primary | ICD-10-CM

## 2024-10-15 DIAGNOSIS — Z71.84 TRAVEL ADVICE ENCOUNTER: Primary | ICD-10-CM

## 2024-10-15 PROCEDURE — G2211 COMPLEX E/M VISIT ADD ON: HCPCS | Mod: 95,,, | Performed by: INTERNAL MEDICINE

## 2024-10-15 PROCEDURE — 99213 OFFICE O/P EST LOW 20 MIN: CPT | Mod: 95,,, | Performed by: INTERNAL MEDICINE

## 2024-10-15 PROCEDURE — 3044F HG A1C LEVEL LT 7.0%: CPT | Mod: CPTII,95,, | Performed by: INTERNAL MEDICINE

## 2024-10-15 PROCEDURE — 3061F NEG MICROALBUMINURIA REV: CPT | Mod: CPTII,95,, | Performed by: INTERNAL MEDICINE

## 2024-10-15 PROCEDURE — 3066F NEPHROPATHY DOC TX: CPT | Mod: CPTII,95,, | Performed by: INTERNAL MEDICINE

## 2024-10-15 NOTE — PROGRESS NOTES
[facility] Progress Note      Audiovisual Visit  Location:  [place], [place]      Chief Complaint     No chief complaint on file.   for HTN    Subjective:      History of Present Illness:  Julio Cesar Fajardo is a 44 y.o. female     Hypertension: has been taking norvasc 2.5mg daily for the past 2 wks but Bps are  still running high ie 130/100s but pt admits that she has not been taking them in ideal situations. She is exercising almost every am and watching her sodium intake.     Past Medical History:  Past Medical History:   Diagnosis Date    Abnormal Pap smear     Abnormal Pap smear of cervix     leep and colpo done       Past Surgical History:  Past Surgical History:   Procedure Laterality Date    CERVICAL BIOPSY  W/ LOOP ELECTRODE EXCISION      COLPOSCOPY         Allergies:  Review of patient's allergies indicates:   Allergen Reactions    Neosporin (neomycin-polymyx)        Home Medications:  Current Outpatient Medications   Medication Sig Dispense Refill    amLODIPine (NORVASC) 2.5 MG tablet Take 1 tablet (2.5 mg total) by mouth once daily. 90 tablet 3    drospirenone, contraceptive, (SLYND) 4 mg (28) Tab Take 1 tablet (4 mg total) by mouth Daily. 84 tablet 3    multivitamin (THERAGRAN) per tablet Take 1 tablet by mouth once daily.       No current facility-administered medications for this visit.        Family History:  Family History   Problem Relation Name Age of Onset    Breast cancer Mother Raoul Fajardo 40    Hemophilia Maternal Grandfather      Breast cancer Other Lisseth 70        possible DCIS    Stomach cancer Paternal Aunt Nik 70 - 79        passed quickly after diagnosis    Hemophilia Other      Hemophilia Other      Colon cancer Neg Hx      Ovarian cancer Neg Hx         Social History:  Social History     Tobacco Use    Smoking status: Never    Smokeless tobacco: Never   Substance Use Topics    Alcohol use: Yes    Drug use: No       Review of Systems:  Pertinent positives and  negatives listed in HPI. All other systems are reviewed and are negative.    Health Maintaince :   Health Maintenance Topics with due status: Not Due       Topic Last Completion Date    Cervical Cancer Screening 01/19/2024    Hemoglobin A1c (Diabetic Prevention Screening) 09/13/2024    Lipid Panel 09/13/2024    RSV Vaccine (Age 60+ and Pregnant patients) Not Due           Eye:   Dental:     Immunizations:     The 10-year ASCVD risk score (Faviola REEVES, et al., 2019) is: 1.4%    Values used to calculate the score:      Age: 44 years      Sex: Female      Is Non- : Yes      Diabetic: No      Tobacco smoker: No      Systolic Blood Pressure: 136 mmHg      Is BP treated: Yes      HDL Cholesterol: 81 mg/dL      Total Cholesterol: 167 mg/dL      Objective:   There were no vitals taken for this visit.     There is no height or weight on file to calculate BMI.       Physical Examination:  General: Alert and awake in no apparent distress  Neuro:  AAOx3; cooperative and pleasant with no focal deficits    Laboratory:      Most Recent Data:  Lab Results   Component Value Date    WBC 5.40 09/13/2024    HGB 13.7 09/13/2024    HCT 40.9 09/13/2024     09/13/2024    CHOL 167 09/13/2024    TRIG 32 09/13/2024    HDL 81 (H) 09/13/2024    ALT 17 09/13/2024    AST 19 09/13/2024     09/13/2024    K 4.1 09/13/2024     (H) 09/13/2024    BUN 11 09/13/2024    CO2 22 (L) 09/13/2024    TSH 1.488 09/13/2024    INR 1.0 09/08/2011    HGBA1C 4.3 09/13/2024              CBC:   WBC   Date Value Ref Range Status   09/13/2024 5.40 3.90 - 12.70 K/uL Final     Hemoglobin   Date Value Ref Range Status   09/13/2024 13.7 12.0 - 16.0 g/dL Final     Hematocrit   Date Value Ref Range Status   09/13/2024 40.9 37.0 - 48.5 % Final     Platelets   Date Value Ref Range Status   09/13/2024 223 150 - 450 K/uL Final     MCV   Date Value Ref Range Status   09/13/2024 85 82 - 98 fL Final     RDW   Date Value Ref Range Status  "  09/13/2024 13.6 11.5 - 14.5 % Final     BMP:   Sodium   Date Value Ref Range Status   09/13/2024 140 136 - 145 mmol/L Final     Potassium   Date Value Ref Range Status   09/13/2024 4.1 3.5 - 5.1 mmol/L Final     Chloride   Date Value Ref Range Status   09/13/2024 112 (H) 95 - 110 mmol/L Final     CO2   Date Value Ref Range Status   09/13/2024 22 (L) 23 - 29 mmol/L Final     BUN   Date Value Ref Range Status   09/13/2024 11 6 - 20 mg/dL Final     Creatinine   Date Value Ref Range Status   09/13/2024 0.9 0.5 - 1.4 mg/dL Final     Glucose   Date Value Ref Range Status   09/13/2024 82 70 - 110 mg/dL Final     Calcium   Date Value Ref Range Status   09/13/2024 10.6 (H) 8.7 - 10.5 mg/dL Final     Magnesium   Date Value Ref Range Status   09/13/2024 2.4 1.6 - 2.6 mg/dL Final     LFTs:   Total Protein   Date Value Ref Range Status   09/13/2024 7.6 6.0 - 8.4 g/dL Final     Albumin   Date Value Ref Range Status   09/13/2024 3.9 3.5 - 5.2 g/dL Final     Total Bilirubin   Date Value Ref Range Status   09/13/2024 0.5 0.1 - 1.0 mg/dL Final     Comment:     For infants and newborns, interpretation of results should be based  on gestational age, weight and in agreement with clinical  observations.    Premature Infant recommended reference ranges:  Up to 24 hours.............<8.0 mg/dL  Up to 48 hours............<12.0 mg/dL  3-5 days..................<15.0 mg/dL  6-29 days.................<15.0 mg/dL       AST   Date Value Ref Range Status   09/13/2024 19 10 - 40 U/L Final     Alkaline Phosphatase   Date Value Ref Range Status   09/13/2024 110 55 - 135 U/L Final     ALT   Date Value Ref Range Status   09/13/2024 17 10 - 44 U/L Final     Coags:   INR   Date Value Ref Range Status   09/08/2011 1.0 0.8 - 1.2 Final     Comment:     ACCP Guideline for Coumadin usage recommends a "target range" of  2.0 - 3.0 for INR for all indicators except mechanical heart valves  and antiphospholipid syndromes which should use 2.5 - 3.5.  .     FLP:  " "    Lab Results   Component Value Date    CHOL 167 09/13/2024     Lab Results   Component Value Date    HDL 81 (H) 09/13/2024     Lab Results   Component Value Date    LDLCALC 79.6 09/13/2024     Lab Results   Component Value Date    TRIG 32 09/13/2024     Lab Results   Component Value Date    CHOLHDL 48.5 09/13/2024      DM:      Hemoglobin A1C   Date Value Ref Range Status   09/13/2024 4.3 4.0 - 5.6 % Final     Comment:     ADA Screening Guidelines:  5.7-6.4%  Consistent with prediabetes  >or=6.5%  Consistent with diabetes    High levels of fetal hemoglobin interfere with the HbA1C  assay. Heterozygous hemoglobin variants (HbS, HgC, etc)do  not significantly interfere with this assay.   However, presence of multiple variants may affect accuracy.       LDL Cholesterol   Date Value Ref Range Status   09/13/2024 79.6 63.0 - 159.0 mg/dL Final     Comment:     The National Cholesterol Education Program (NCEP) has set the  following guidelines (reference values) for LDL Cholesterol:  Optimal.......................<130 mg/dL  Borderline High...............130-159 mg/dL  High..........................160-189 mg/dL  Very High.....................>190 mg/dL       Creatinine   Date Value Ref Range Status   09/13/2024 0.9 0.5 - 1.4 mg/dL Final     Thyroid:   TSH   Date Value Ref Range Status   09/13/2024 1.488 0.400 - 4.000 uIU/mL Final     Anemia: No results found for: "IRON", "TIBC", "FERRITIN", "ITALVAMP57", "FOLATE"  Cardiac: No results found for: "TROPONINI", "CKTOTAL", "CKMB", "BNP"  Urinalysis:   Color, UA   Date Value Ref Range Status   09/13/2024 Yellow Yellow, Straw, Greer Final     Specific Gravity, UA   Date Value Ref Range Status   09/13/2024 1.025 1.005 - 1.030 Final     Nitrite, UA   Date Value Ref Range Status   09/13/2024 Negative Negative Final     Ketones, UA   Date Value Ref Range Status   09/13/2024 Negative Negative Final     WBC, UA   Date Value Ref Range Status   09/13/2024 2 0 - 5 /hpf Final "       Other Results:  EKG (my interpretation):     Radiology:  US Pelvis Comp with Transvag NON-OB (xpd  Narrative: EXAMINATION:  US PELVIS COMP WITH TRANSVAG NON-OB (XPD)    CLINICAL HISTORY:  Hypertrophy of uterus    TECHNIQUE:  Transabdominal sonography of the pelvis was performed, followed by transvaginal sonography to better evaluate the uterus and ovaries.    COMPARISON:  None.    FINDINGS:  Uterus:    Size: 14 x 12 x 10 cm    Masses: Multiple fibroids largest measures at least 10 cm.    Endometrium: Obscured by the fibroids.    Right ovary:    Size: 3.0 cm    Appearance: Normal    Vascular flow: Normal.    Left ovary:    Size: 4.1 cm    Appearance: Normal    Vascular Flow: Normal.    Free Fluid:    None.  Impression: Enlarged fibroid uterus    Electronically signed by: Bradley Silveira Jr  Date:    12/09/2023  Time:    16:07          Assessment/Plan     Julio Cesar Fajardo is a 44 y.o. female with:  1. Primary hypertension  Overview:  Normal EKG and ECHO in 9/2024     Assessment & Plan:  -Check Bps at home weekly and prn symptoms for goal BP <130/80.  If BP >130/80, increase norvasc 2.5mg bid.    Avoid Haney's table salt; use Mrs. Garcia or Dejuan Craig no salt   -Start healthy diet high in fiber (25-35 gm daily), low fat dairy, lean protein, low in saturated/trans fat (minimize cheese, creamy salad dressings); high in calcium/magnesium/potassium; 1.5 gm sodium diet; low in processed sugars and foods, ie  WHITE sugars, bread, pasta, rice, ice cream, cookies, cake, doughnuts  -Drink 6-8 glasses of water daily  -Moderate exercise 30 min 5 times per week or 75 min intense exercise biweekly   -Start 8-10 hour intermittent fasting diet   -Avoid eating 3 hours prior to bedtime  -Reduce alcohol to 1-2 servings (1 serving = 1 oz wine, 1 oz hard liquor, 12 oz beer) per day  -Avoid smoking, vaping, stimulant drugs/mediations ie illicit drugs, pseudo-ephedrine  -Minimize NSAIDs    Can increase norvasc 2.5mg po bid if BP  consistently >130/80 despite diet and exercise  Send BP readings to Dr. Prakash                  This includes face to face time and non-face to face time preparing to see the patient (eg, review of tests), obtaining and/or reviewing separately obtained history, documenting clinical information in the electronic or other health record, independently interpreting results and communicating results to the patient/family/caregiver, or care coordinator.   Code Status:     Johanne Prakash MD

## 2024-10-15 NOTE — ASSESSMENT & PLAN NOTE
-Check Bps at home weekly and prn symptoms for goal BP <130/80.  If BP >130/80, increase norvasc 2.5mg bid.    Avoid Haney's table salt; use Mrs. Garcia or Dejuan Craig no salt   -Start healthy diet high in fiber (25-35 gm daily), low fat dairy, lean protein, low in saturated/trans fat (minimize cheese, creamy salad dressings); high in calcium/magnesium/potassium; 1.5 gm sodium diet; low in processed sugars and foods, ie  WHITE sugars, bread, pasta, rice, ice cream, cookies, cake, doughnuts  -Drink 6-8 glasses of water daily  -Moderate exercise 30 min 5 times per week or 75 min intense exercise biweekly   -Start 8-10 hour intermittent fasting diet   -Avoid eating 3 hours prior to bedtime  -Reduce alcohol to 1-2 servings (1 serving = 1 oz wine, 1 oz hard liquor, 12 oz beer) per day  -Avoid smoking, vaping, stimulant drugs/mediations ie illicit drugs, pseudo-ephedrine  -Minimize NSAIDs    Can increase norvasc 2.5mg po bid if BP consistently >130/80 despite diet and exercise  Send BP readings to Dr. Prakash

## 2024-10-16 NOTE — TELEPHONE ENCOUNTER
Pt states she will be traveling to [facility] and requesting meds for malaria. Pended referral to travel clinic.

## 2024-10-18 ENCOUNTER — PATIENT MESSAGE (OUTPATIENT)
Dept: PRIMARY CARE CLINIC | Facility: CLINIC | Age: 44
End: 2024-10-18
Payer: COMMERCIAL

## 2024-10-25 ENCOUNTER — HOSPITAL ENCOUNTER (OUTPATIENT)
Dept: RADIOLOGY | Facility: HOSPITAL | Age: 44
Discharge: HOME | End: 2024-10-25
Attending: INTERNAL MEDICINE
Payer: COMMERCIAL

## 2024-10-25 DIAGNOSIS — Z00.00 PREVENTATIVE HEALTH CARE: ICD-10-CM

## 2024-10-25 PROCEDURE — 77067 SCR MAMMO BI INCL CAD: CPT | Mod: TC

## 2024-10-25 PROCEDURE — 77063 BREAST TOMOSYNTHESIS BI: CPT | Mod: 26,,, | Performed by: RADIOLOGY

## 2024-10-25 PROCEDURE — 77063 BREAST TOMOSYNTHESIS BI: CPT | Mod: TC

## 2024-10-25 PROCEDURE — 77067 SCR MAMMO BI INCL CAD: CPT | Mod: 26,,, | Performed by: RADIOLOGY

## 2024-10-29 ENCOUNTER — TELEPHONE (OUTPATIENT)
Dept: HEMATOLOGY/ONCOLOGY | Facility: CLINIC | Age: 44
End: 2024-10-29
Payer: COMMERCIAL

## 2024-11-01 ENCOUNTER — PATIENT MESSAGE (OUTPATIENT)
Dept: HEMATOLOGY/ONCOLOGY | Facility: CLINIC | Age: 44
End: 2024-11-01
Payer: COMMERCIAL

## 2024-11-01 ENCOUNTER — TELEPHONE (OUTPATIENT)
Dept: HEMATOLOGY/ONCOLOGY | Facility: CLINIC | Age: 44
End: 2024-11-01
Payer: COMMERCIAL

## 2024-11-01 PROBLEM — Z13.71 BRCA1 GENE MUTATION NEGATIVE: Status: ACTIVE | Noted: 2024-11-01

## 2024-11-01 PROBLEM — Z13.71 BRCA2 GENE MUTATION NEGATIVE: Status: ACTIVE | Noted: 2024-11-01

## 2024-12-06 ENCOUNTER — CLINICAL SUPPORT (OUTPATIENT)
Dept: ENDOSCOPY | Facility: HOSPITAL | Age: 44
End: 2024-12-06
Attending: INTERNAL MEDICINE
Payer: COMMERCIAL

## 2024-12-06 ENCOUNTER — TELEPHONE (OUTPATIENT)
Dept: ENDOSCOPY | Facility: HOSPITAL | Age: 44
End: 2024-12-06

## 2024-12-06 VITALS — HEIGHT: 69 IN | WEIGHT: 178 LBS | BODY MASS INDEX: 26.36 KG/M2

## 2024-12-06 DIAGNOSIS — Z00.00 PREVENTATIVE HEALTH CARE: ICD-10-CM

## 2024-12-06 DIAGNOSIS — Z12.11 SPECIAL SCREENING FOR MALIGNANT NEOPLASMS, COLON: Primary | ICD-10-CM

## 2024-12-06 RX ORDER — SODIUM, POTASSIUM,MAG SULFATES 17.5-3.13G
1 SOLUTION, RECONSTITUTED, ORAL ORAL DAILY
Qty: 1 KIT | Refills: 0 | Status: SHIPPED | OUTPATIENT
Start: 2024-12-06 | End: 2024-12-09

## 2024-12-06 NOTE — TELEPHONE ENCOUNTER
Referral for procedure from PAT appointment      Spoke to patient to schedule procedure(s) Colonoscopy       Physician to perform procedure(s) Dr. DENISE Tamayo  Date of Procedure (s) 01/31/25  Arrival Time 10:15 AM  Time of Procedure(s) 11:15 AM   Location of Procedure(s) [place] [address]nd Floor  Type of Rx Prep sent to patient: Suprep  Instructions provided to patient via [facility]    Patient was informed on the following information and verbalized understanding. Screening questionnaire reviewed with patient and complete. If procedure requires anesthesia, a responsible adult needs to be present to accompany the patient home, patient cannot drive after receiving anesthesia. Appointment details are tentative, especially check-in time. Patient will receive a prep-op call 7 days prior to confirm check-in time for procedure. If applicable the patient should contact their pharmacy to verify Rx for procedure prep is ready for pick-up. Patient was advised to call the scheduling department at [phone] if pharmacy states no Rx is available. Patient was advised to call the endoscopy scheduling department if any questions or concerns arise.      [facility] Scheduling Department

## 2024-12-14 RX ORDER — DROSPIRENONE 4 MG/1
4 TABLET, FILM COATED ORAL DAILY
Qty: 84 TABLET | Refills: 3 | Status: CANCELLED | OUTPATIENT
Start: 2024-12-14

## 2024-12-15 RX ORDER — DROSPIRENONE 4 MG/1
4 TABLET, FILM COATED ORAL DAILY
Qty: 84 TABLET | Refills: 0 | Status: SHIPPED | OUTPATIENT
Start: 2024-12-15

## 2024-12-15 NOTE — TELEPHONE ENCOUNTER
Refill Decision Note   Julio Cesar Fajardo  is requesting a refill authorization.  Brief Assessment and Rationale for Refill:  Approve     Medication Therapy Plan:         Comments:     Note composed:10:42 AM 12/15/2024

## 2024-12-16 ENCOUNTER — PATIENT MESSAGE (OUTPATIENT)
Dept: ADMINISTRATIVE | Facility: HOSPITAL | Age: 44
End: 2024-12-16
Payer: COMMERCIAL

## 2025-01-24 ENCOUNTER — PATIENT MESSAGE (OUTPATIENT)
Dept: ENDOSCOPY | Facility: HOSPITAL | Age: 45
End: 2025-01-24
Payer: COMMERCIAL

## 2025-01-24 ENCOUNTER — TELEPHONE (OUTPATIENT)
Dept: GASTROENTEROLOGY | Facility: CLINIC | Age: 45
End: 2025-01-24
Payer: COMMERCIAL

## 2025-01-24 NOTE — TELEPHONE ENCOUNTER
----- Message from Quynh sent at 1/24/2025  3:30 PM CST -----  Name of Who is Calling: RASHAD DOUGLASS [[id number]]      What is the request in detail: pt is requesting a call back to cxl her procedure scheduled on 01/31/2025. Please advise       Can the clinic reply by [facility]: No      What Number to Call Back if not in [facility]: Telephone Information:  Mobile          [phone]

## 2025-01-28 ENCOUNTER — TELEPHONE (OUTPATIENT)
Dept: ENDOSCOPY | Facility: HOSPITAL | Age: 45
End: 2025-01-28
Payer: COMMERCIAL

## 2025-01-28 NOTE — TELEPHONE ENCOUNTER
Received inSendiaet message to call patient regarding colonoscopy scheduled on 1/31/25. Called patient. Left voicemail message to contact Endoscopy Scheduling at # [phone].

## 2025-03-12 RX ORDER — DROSPIRENONE 4 MG/1
4 TABLET, FILM COATED ORAL DAILY
Qty: 84 TABLET | Refills: 0 | Status: SHIPPED | OUTPATIENT
Start: 2025-03-12

## 2025-03-12 NOTE — TELEPHONE ENCOUNTER
Refill Decision Note   Julio Cesar Fajardo  is requesting a refill authorization.  Brief Assessment and Rationale for Refill:  Approve     Medication Therapy Plan:         Comments:     Note composed:12:35 PM 03/12/2025

## 2025-06-05 ENCOUNTER — TELEPHONE (OUTPATIENT)
Dept: OBSTETRICS AND GYNECOLOGY | Facility: CLINIC | Age: 45
End: 2025-06-05
Payer: COMMERCIAL

## 2025-06-05 RX ORDER — DROSPIRENONE 4 MG/1
4 TABLET, FILM COATED ORAL DAILY
Qty: 84 TABLET | Refills: 0 | Status: SHIPPED | OUTPATIENT
Start: 2025-06-05

## 2025-06-25 ENCOUNTER — PATIENT MESSAGE (OUTPATIENT)
Dept: ADMINISTRATIVE | Facility: HOSPITAL | Age: 45
End: 2025-06-25
Payer: COMMERCIAL

## 2025-06-25 ENCOUNTER — PATIENT OUTREACH (OUTPATIENT)
Dept: ADMINISTRATIVE | Facility: HOSPITAL | Age: 45
End: 2025-06-25
Payer: COMMERCIAL

## 2025-06-25 DIAGNOSIS — Z12.12 SCREENING FOR COLORECTAL CANCER: Primary | ICD-10-CM

## 2025-06-25 DIAGNOSIS — Z12.11 SCREENING FOR COLORECTAL CANCER: Primary | ICD-10-CM

## 2025-06-27 ENCOUNTER — CLINICAL SUPPORT (OUTPATIENT)
Dept: ENDOSCOPY | Facility: HOSPITAL | Age: 45
End: 2025-06-27
Attending: INTERNAL MEDICINE
Payer: COMMERCIAL

## 2025-06-27 ENCOUNTER — TELEPHONE (OUTPATIENT)
Dept: ENDOSCOPY | Facility: HOSPITAL | Age: 45
End: 2025-06-27

## 2025-06-27 DIAGNOSIS — Z12.12 SCREENING FOR COLORECTAL CANCER: ICD-10-CM

## 2025-06-27 DIAGNOSIS — Z12.11 SCREENING FOR COLORECTAL CANCER: ICD-10-CM

## 2025-06-27 NOTE — TELEPHONE ENCOUNTER
Patient stated she would like to schedule herself to do the colonoscopy but I did give her our number just in case she was having any issues and she will call us back she was not able to schedule with us at the moment

## 2025-07-09 ENCOUNTER — OFFICE VISIT (OUTPATIENT)
Dept: URGENT CARE | Facility: CLINIC | Age: 45
End: 2025-07-09
Payer: COMMERCIAL

## 2025-07-09 VITALS
HEIGHT: 67 IN | SYSTOLIC BLOOD PRESSURE: 161 MMHG | OXYGEN SATURATION: 98 % | TEMPERATURE: 99 F | HEART RATE: 82 BPM | WEIGHT: 187.38 LBS | RESPIRATION RATE: 16 BRPM | DIASTOLIC BLOOD PRESSURE: 91 MMHG | BODY MASS INDEX: 29.41 KG/M2

## 2025-07-09 DIAGNOSIS — R05.9 COUGH, UNSPECIFIED TYPE: ICD-10-CM

## 2025-07-09 DIAGNOSIS — R09.82 POST-NASAL DRIP: ICD-10-CM

## 2025-07-09 DIAGNOSIS — H66.001 NON-RECURRENT ACUTE SUPPURATIVE OTITIS MEDIA OF RIGHT EAR WITHOUT SPONTANEOUS RUPTURE OF TYMPANIC MEMBRANE: Primary | ICD-10-CM

## 2025-07-09 DIAGNOSIS — I10 ELEVATED BLOOD PRESSURE READING IN OFFICE WITH DIAGNOSIS OF HYPERTENSION: ICD-10-CM

## 2025-07-09 DIAGNOSIS — J02.9 SORE THROAT: ICD-10-CM

## 2025-07-09 DIAGNOSIS — I10 PRIMARY HYPERTENSION: ICD-10-CM

## 2025-07-09 LAB
CTP QC/QA: YES
CTP QC/QA: YES
MOLECULAR STREP A: NEGATIVE
SARS-COV+SARS-COV-2 AG RESP QL IA.RAPID: NEGATIVE

## 2025-07-09 PROCEDURE — 87651 STREP A DNA AMP PROBE: CPT | Mod: QW,S$GLB,, | Performed by: NURSE PRACTITIONER

## 2025-07-09 PROCEDURE — 87811 SARS-COV-2 COVID19 W/OPTIC: CPT | Mod: QW,S$GLB,, | Performed by: NURSE PRACTITIONER

## 2025-07-09 PROCEDURE — 99204 OFFICE O/P NEW MOD 45 MIN: CPT | Mod: S$GLB,,, | Performed by: NURSE PRACTITIONER

## 2025-07-09 RX ORDER — FLUTICASONE PROPIONATE 50 MCG
1 SPRAY, SUSPENSION (ML) NASAL DAILY
Qty: 16 G | Refills: 0 | Status: SHIPPED | OUTPATIENT
Start: 2025-07-09 | End: 2025-08-08

## 2025-07-09 RX ORDER — AMOXICILLIN 875 MG/1
875 TABLET, COATED ORAL EVERY 12 HOURS
Qty: 14 TABLET | Refills: 0 | Status: SHIPPED | OUTPATIENT
Start: 2025-07-09 | End: 2025-07-16

## 2025-07-09 NOTE — PROGRESS NOTES
"Subjective:      Patient ID: Julio Cesar Fajardo is a 44 y.o. female.    Vitals:  height is 5' 7" (1.702 m) and weight is 85 kg (187 lb 6.3 oz). Her temperature is 98.6 °F (37 °C). Her blood pressure is 161/91 (abnormal) and her pulse is 82. Her respiration is 16 and oxygen saturation is 98%.     Chief Complaint: Sinus Problem    Julio Cesar Fajardo is a 44 y.o. female who presents today with a chief complaint of post nasal drip and sore throat  that began 6 days ago.  Patient took OTC go selzer, day/nyquil w/ mild relief to symptoms. She also reports some right sided ear pain, chills, and body aches.    Sinus Problem  This is a new problem. The current episode started in the past 7 days. The problem has been gradually worsening since onset. Associated symptoms include chills, coughing, ear pain, sinus pressure and a sore throat. Pertinent negatives include no diaphoresis.       Constitution: Positive for chills. Negative for sweating, fatigue and fever.   HENT:  Positive for ear pain, postnasal drip, sinus pressure and sore throat.    Respiratory:  Positive for cough.       Objective:     Physical Exam   Constitutional: She is oriented to person, place, and time. She appears well-developed. She is cooperative.  Non-toxic appearance. She does not appear ill. No distress.   HENT:   Head: Normocephalic and atraumatic.   Ears:   Right Ear: Hearing, external ear and ear canal normal. Tympanic membrane is erythematous and bulging. No middle ear effusion.   Left Ear: Hearing, external ear and ear canal normal. Tympanic membrane is erythematous. Tympanic membrane is not bulging.  No middle ear effusion.   Nose: No mucosal edema, rhinorrhea, nasal deformity or congestion. No epistaxis. Right sinus exhibits no maxillary sinus tenderness and no frontal sinus tenderness. Left sinus exhibits no maxillary sinus tenderness and no frontal sinus tenderness.   Mouth/Throat: Uvula is midline, oropharynx is clear and moist and mucous membranes are " normal. No trismus in the jaw. Normal dentition. No uvula swelling. No oropharyngeal exudate, posterior oropharyngeal edema or posterior oropharyngeal erythema.   Eyes: Conjunctivae and lids are normal. No scleral icterus.   Neck: Trachea normal and phonation normal. Neck supple. No edema present. No erythema present. No neck rigidity present.   Cardiovascular: Normal rate, regular rhythm, normal heart sounds and normal pulses.   Pulmonary/Chest: Effort normal and breath sounds normal. No stridor. No respiratory distress. She has no decreased breath sounds. She has no wheezes. She has no rhonchi.   Abdominal: Normal appearance.   Musculoskeletal: Normal range of motion.         General: No deformity. Normal range of motion.   Lymphadenopathy:     She has no cervical adenopathy.   Neurological: She is alert and oriented to person, place, and time. She exhibits normal muscle tone. Coordination normal.   Skin: Skin is warm, dry, intact, not diaphoretic and not pale.   Psychiatric: Her speech is normal and behavior is normal. Judgment and thought content normal.   Nursing note and vitals reviewed.      Assessment:     1. Non-recurrent acute suppurative otitis media of right ear without spontaneous rupture of tympanic membrane    2. Cough, unspecified type    3. Sore throat    4. Post-nasal drip    5. Elevated blood pressure reading in office with diagnosis of hypertension    6. Primary hypertension        Plan:     COVID negative  Strep negative  Treating right sided ear infection with Amoxicillin  Instructed to start using Flonase and antihistamine for post nasal drip  Mucinex for cough.    Pt's BP elevated today. She has hx of HTN and takes amlodipine daily. Advised to continue monitoring and follow up with PCP. Avoid decongestants such as Sudafed as it can elevate blood pressure as well.    Non-recurrent acute suppurative otitis media of right ear without spontaneous rupture of tympanic membrane  -     amoxicillin  (AMOXIL) 875 MG tablet; Take 1 tablet (875 mg total) by mouth every 12 (twelve) hours. for 7 days  Dispense: 14 tablet; Refill: 0    Cough, unspecified type  -     SARS Coronavirus 2 Antigen, POCT Manual Read    Sore throat  -     POCT Strep A, Molecular    Post-nasal drip  -     fluticasone propionate (FLONASE) 50 mcg/actuation nasal spray; 1 spray (50 mcg total) by Each Nostril route once daily. for 7 days  Dispense: 16 g; Refill: 0    Elevated blood pressure reading in office with diagnosis of hypertension    Primary hypertension             Patient Instructions   Prescriptions sent to pharmacy:  Amoxicillin- antibiotic- take twice a day for 7 days.     Flonase- steroid nasal spray- spray in nostrils 1-2 times a day to help with runny nose and sinus congestion    Other recommendations:  Oral antihistamine (Claritin, Zyrtec, Allegra,or Xyzal) for post nasal drip and runny nose  Cough expectorant (Mucinex DM) to break up mucus in your chest. Take with a full glass of water        Please drink plenty of fluids.  Please get plenty of rest.  Nasal irrigation with a saline spray or Netti Pot like device per their directions is also recommended.  If you  smoke, please stop smoking.    To help ease a sore throat, you can:  Use a sore throat spray.  Suck on hard candy or throat lozenges.  Gargle with warm saltwater a few times each day. Mix of 1/4 teaspoon (1.25 grams) salt in 8 ounces (240 mL) of warm water.  Use a cool mist humidifier to help you breathe easier.        If not allergic, take Tylenol (Acetaminophen) 650 mg to  1 g every 6 hours as needed  and/or Motrin (Ibuprofen) 600 to 800 mg every 6 hours as needed for fever or pain.      Please return here or go to the Emergency Department for any concerns or worsening of condition.

## 2025-07-09 NOTE — PATIENT INSTRUCTIONS
Prescriptions sent to pharmacy:  Amoxicillin- antibiotic- take twice a day for 7 days.     Flonase- steroid nasal spray- spray in nostrils 1-2 times a day to help with runny nose and sinus congestion    Other recommendations:  Oral antihistamine (Claritin, Zyrtec, Allegra,or Xyzal) for post nasal drip and runny nose  Cough expectorant (Mucinex DM) to break up mucus in your chest. Take with a full glass of water        Please drink plenty of fluids.  Please get plenty of rest.  Nasal irrigation with a saline spray or Netti Pot like device per their directions is also recommended.  If you  smoke, please stop smoking.    To help ease a sore throat, you can:  Use a sore throat spray.  Suck on hard candy or throat lozenges.  Gargle with warm saltwater a few times each day. Mix of 1/4 teaspoon (1.25 grams) salt in 8 ounces (240 mL) of warm water.  Use a cool mist humidifier to help you breathe easier.        If not allergic, take Tylenol (Acetaminophen) 650 mg to  1 g every 6 hours as needed  and/or Motrin (Ibuprofen) 600 to 800 mg every 6 hours as needed for fever or pain.      Please return here or go to the Emergency Department for any concerns or worsening of condition.

## 2025-07-31 ENCOUNTER — PATIENT MESSAGE (OUTPATIENT)
Dept: PRIMARY CARE CLINIC | Facility: CLINIC | Age: 45
End: 2025-07-31
Payer: COMMERCIAL

## 2025-08-01 NOTE — TELEPHONE ENCOUNTER
Last VV with LoJohanne MD , 10/15/2024 - HTN  Earliest available appt with PCP or at clinic is in September.  Please assist pt in scheduling an appt, if possible.

## 2025-08-08 ENCOUNTER — OFFICE VISIT (OUTPATIENT)
Dept: OBSTETRICS AND GYNECOLOGY | Facility: CLINIC | Age: 45
End: 2025-08-08
Attending: OBSTETRICS & GYNECOLOGY
Payer: COMMERCIAL

## 2025-08-08 VITALS
HEIGHT: 68 IN | WEIGHT: 185.44 LBS | BODY MASS INDEX: 28.1 KG/M2 | DIASTOLIC BLOOD PRESSURE: 84 MMHG | SYSTOLIC BLOOD PRESSURE: 132 MMHG

## 2025-08-08 DIAGNOSIS — Z12.31 VISIT FOR SCREENING MAMMOGRAM: ICD-10-CM

## 2025-08-08 DIAGNOSIS — Z01.419 WOMEN'S ANNUAL ROUTINE GYNECOLOGICAL EXAMINATION: Primary | ICD-10-CM

## 2025-08-08 DIAGNOSIS — D21.9 FIBROIDS: ICD-10-CM

## 2025-08-08 DIAGNOSIS — Z30.41 ENCOUNTER FOR SURVEILLANCE OF CONTRACEPTIVE PILLS: ICD-10-CM

## 2025-08-08 DIAGNOSIS — Z80.3 FAMILY HISTORY OF BREAST CANCER IN MOTHER: ICD-10-CM

## 2025-08-08 PROCEDURE — 99999 PR PBB SHADOW E&M-EST. PATIENT-LVL III: CPT | Mod: PBBFAC,,, | Performed by: OBSTETRICS & GYNECOLOGY

## 2025-08-08 RX ORDER — DROSPIRENONE 4 MG/1
4 TABLET, FILM COATED ORAL DAILY
Qty: 84 TABLET | Refills: 3 | Status: SHIPPED | OUTPATIENT
Start: 2025-08-08

## 2025-08-08 NOTE — PROGRESS NOTES
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   She continues on Slynd progesterone only pills with essentially no bleeding.  She has a history of large uterine fibroids, followed conservatively.  Prior scan 2023 showed the largest fibroid to be 10 cm.   She has history of LEEP 10/2011 with subsequent normal paps.  Family history of mother with breast cancer.  BP significantly improve with Norvasc.  No gyn complaints.    Blood pressure 132/84, height 5' 8" (1.727 m), weight 84.1 kg (185 lb 6.5 oz).    Summary:  Pap 09: ASCUS, HPV+  Colpo 09: Ecto: No dysplasia, Ecc: Negative  Pap 6/18/10: LGSIL  Pap 11: LGSIL  Colpo 11: Ecto: EARLINE II, Ecc: Negative  LEEP 10/14/11: Focal atypia  Pap 3/15/13: Negative  Pap 3/19/14: ASCUS, HPV negative  Pap 9/15/17: Negative  Pap 18: Negative  Pap 22: Negative, HPV: Negative  Pap 10/6/23: Unsatisfactory, HPV: Negative  Pap 24: Negative, HPV 10/6/23: Negative    10/25/2024 mammogram: Negative, TC 17.76%    23 Pelvic sono:  FINDINGS:  Uterus:  Size: 14 x 12 x 10 cm  Masses: Multiple fibroids largest measures at least 10 cm.  Endometrium: Obscured by the fibroids.  Right ovary:  Size: 3.0 cm  Appearance: Normal  Vascular flow: Normal.  Left ovary:  Size: 4.1 cm  Appearance: Normal  Vascular Flow: Normal.  Free Fluid:  None.  Impression:  Enlarged fibroid uterus       Past Medical History:   Diagnosis Date    Abnormal Pap smear     Abnormal Pap smear of cervix     leep and colpo done       Past Surgical History:   Procedure Laterality Date    CERVICAL BIOPSY  W/ LOOP ELECTRODE EXCISION      COLPOSCOPY         OB History          0    Para        Term   0            AB        Living             SAB        IAB        Ectopic        Multiple        Live Births                     ROS:  GENERAL: Feeling well overall.   SKIN: Denies rash or lesions.   HEAD: Denies head injury or headache.   NODES: Denies " enlarged lymph nodes.   CHEST: Denies chest pain or shortness of breath.   CARDIOVASCULAR: Denies palpitations or left sided chest pain.   ABDOMEN: No abdominal pain, nausea, vomiting or rectal bleeding.   URINARY: No dysuria or hematuria.  REPRODUCTIVE: See HPI.   BREASTS: Denies pain, lumps, or nipple discharge.   HEMATOLOGIC: No easy bruisability or excessive bleeding.   MUSCULOSKELETAL: Denies joint pain or swelling.   NEUROLOGIC: Denies syncope or weakness.   PSYCHIATRIC: Denies depression.    PE:   (chaperone present during entire exam)  APPEARANCE: Well nourished, well developed, in no acute distress.  BREASTS: Symmetrical, no skin changes or visible lesions. No palpable masses, nipple discharge or adenopathy bilaterally.  ABDOMEN: Soft. No tenderness.  Uterus palpable in lower abdomen.  VULVA: No lesions. Normal female genitalia.  URETHRAL MEATUS: Normal size and location, no lesions, no prolapse.  URETHRA: No masses, tenderness, prolapse or scarring.  VAGINA: Moist and well rugated, no abnormal discharge, no significant cystocele or rectocele.  CERVIX: No lesions and discharge. PAP done.  UTERUS:  Large in size (16 week) with fibroids, non-tender, bladder base nontender.  ADNEXA: No masses, tenderness or CDS nodularity.  ANUS PERINEUM: Normal.      Diagnosis:  1. Women's annual routine gynecological examination    2. Encounter for surveillance of contraceptive pills    3. Fibroids    4. Visit for screening mammogram    5. Family history of breast cancer in mother          PLAN:    Orders Placed This Encounter    US Pelvis Comp with Transvag NON-OB (xpd    US Pelvis Comp with Transvag NON-OB (xpd    drospirenone, contraceptive, (SLYND) 4 mg (28) Tab       Patient was counseled today on the need for annual gyn exams.  She is doing well on Slynd OCPs and desires to continue.  We also discussed her uterine fibroids and management options:  continued conservative management versus surgical treatment.  For now,  she she is essentially asymptomatic and desires continued conservative management.  She will have a pelvic ultrasound performed for re-evaluation of fibroids.    Follow-up in 1 year.    This note was created with voice recognition software.  Grammatical, syntax and spelling errors may be inevitable.

## 2025-08-13 DIAGNOSIS — I10 PRIMARY HYPERTENSION: ICD-10-CM

## 2025-08-13 RX ORDER — AMLODIPINE BESYLATE 2.5 MG/1
2.5 TABLET ORAL DAILY
Qty: 90 TABLET | Refills: 3 | Status: CANCELLED | OUTPATIENT
Start: 2025-08-13 | End: 2026-08-13

## 2025-08-13 RX ORDER — AMLODIPINE BESYLATE 2.5 MG/1
2.5 TABLET ORAL DAILY
Qty: 90 TABLET | Refills: 0 | Status: SHIPPED | OUTPATIENT
Start: 2025-08-13 | End: 2025-11-11